# Patient Record
Sex: FEMALE | Race: WHITE | Employment: UNEMPLOYED | URBAN - METROPOLITAN AREA
[De-identification: names, ages, dates, MRNs, and addresses within clinical notes are randomized per-mention and may not be internally consistent; named-entity substitution may affect disease eponyms.]

---

## 2017-09-25 DIAGNOSIS — Z32.01 PREGNANCY TEST POSITIVE: Primary | ICD-10-CM

## 2017-10-05 ENCOUNTER — PRENATAL OFFICE VISIT (OUTPATIENT)
Dept: NURSING | Facility: CLINIC | Age: 22
End: 2017-10-05
Payer: COMMERCIAL

## 2017-10-05 DIAGNOSIS — Z32.01 PREGNANCY TEST POSITIVE: Primary | ICD-10-CM

## 2017-10-05 LAB
ABO + RH BLD: NORMAL
ABO + RH BLD: NORMAL
BETA HCG QUAL IFA URINE: POSITIVE
BLD GP AB SCN SERPL QL: NORMAL
BLOOD BANK CMNT PATIENT-IMP: NORMAL
ERYTHROCYTE [DISTWIDTH] IN BLOOD BY AUTOMATED COUNT: 13 % (ref 10–15)
HCT VFR BLD AUTO: 41.6 % (ref 35–47)
HGB BLD-MCNC: 14 G/DL (ref 11.7–15.7)
MCH RBC QN AUTO: 31.1 PG (ref 26.5–33)
MCHC RBC AUTO-ENTMCNC: 33.7 G/DL (ref 31.5–36.5)
MCV RBC AUTO: 92 FL (ref 78–100)
PLATELET # BLD AUTO: 342 10E9/L (ref 150–450)
RBC # BLD AUTO: 4.5 10E12/L (ref 3.8–5.2)
SPECIMEN EXP DATE BLD: NORMAL
WBC # BLD AUTO: 11.9 10E9/L (ref 4–11)

## 2017-10-05 PROCEDURE — 99207 ZZC NO CHARGE NURSE ONLY: CPT

## 2017-10-05 PROCEDURE — 84703 CHORIONIC GONADOTROPIN ASSAY: CPT | Performed by: OBSTETRICS & GYNECOLOGY

## 2017-10-05 PROCEDURE — 36415 COLL VENOUS BLD VENIPUNCTURE: CPT | Performed by: OBSTETRICS & GYNECOLOGY

## 2017-10-05 PROCEDURE — 86900 BLOOD TYPING SEROLOGIC ABO: CPT | Performed by: OBSTETRICS & GYNECOLOGY

## 2017-10-05 PROCEDURE — 87340 HEPATITIS B SURFACE AG IA: CPT | Performed by: OBSTETRICS & GYNECOLOGY

## 2017-10-05 PROCEDURE — 85027 COMPLETE CBC AUTOMATED: CPT | Performed by: OBSTETRICS & GYNECOLOGY

## 2017-10-05 PROCEDURE — 86762 RUBELLA ANTIBODY: CPT | Performed by: OBSTETRICS & GYNECOLOGY

## 2017-10-05 PROCEDURE — 86780 TREPONEMA PALLIDUM: CPT | Performed by: OBSTETRICS & GYNECOLOGY

## 2017-10-05 PROCEDURE — 87389 HIV-1 AG W/HIV-1&-2 AB AG IA: CPT | Performed by: OBSTETRICS & GYNECOLOGY

## 2017-10-05 PROCEDURE — 86901 BLOOD TYPING SEROLOGIC RH(D): CPT | Performed by: OBSTETRICS & GYNECOLOGY

## 2017-10-05 PROCEDURE — 87086 URINE CULTURE/COLONY COUNT: CPT | Performed by: OBSTETRICS & GYNECOLOGY

## 2017-10-05 PROCEDURE — 86850 RBC ANTIBODY SCREEN: CPT | Performed by: OBSTETRICS & GYNECOLOGY

## 2017-10-05 RX ORDER — PRENATAL VIT/IRON FUM/FOLIC AC 27MG-0.8MG
1 TABLET ORAL DAILY
Qty: 100 TABLET | Refills: 3 | COMMUNITY
Start: 2017-10-05

## 2017-10-05 NOTE — NURSING NOTE
NPN nurse visit. 1st dr visit scheduled for 10/18/17 with Frankie Aragon M.D. Pap due. Last pap unsure.  8w5d.  ARI Herrera RN

## 2017-10-05 NOTE — MR AVS SNAPSHOT
After Visit Summary   10/5/2017    Opal Hillman    MRN: [id number]           Patient Information     Date Of Birth          1995        Visit Information        Provider Department      10/5/2017 2:00 PM [facility] PRENATAL NURSE [place] [facility]        Today's Diagnoses     Pregnancy test positive    -  1       Follow-ups after your visit        Your next 10 appointments already scheduled     Oct 10, 2017  2:00 PM CDT   US OB < 14 WEEKS WITH TRANSVAGINAL SINGLE with OXUS1   [facility] ([facility])    [address] [id number] [phone]           Please bring a list of your medicines (including vitamins, minerals and over-the-counter drugs). Also, tell your doctor about any allergies you may have. Wear comfortable clothes and leave your valuables at home.  If you re less than 20 weeks drink four 8-ounce glasses of fluid an hour before your exam. If you need to empty your bladder before your exam, try to release only a little urine. Then, drink another glass of fluid.  You may have up to two family members in the exam room. If you bring a small child, an adult must be there to care for him or her.  Please call the Imaging Department at your exam site with any questions.            Oct 18, 2017  2:30 PM CDT   New Prenatal with Frankie Aragon MD   [facility] ([facility]    [address] [place] [address] [phone]              Future tests that were ordered for you today     Open Future Orders        Priority Expected Expires Ordered    US OB <14 Weeks w Transvaginal Single Routine  10/5/2018 10/5/2017            Who to contact     If you have questions or need follow up information about today's clinic visit or your schedule please contact [facility] directly at [phone].  Normal or non-critical lab and imaging results will be  "communicated to you by Explore Engagehart, letter or phone within 4 business days after the clinic has received the results. If you do not hear from us within 7 days, please contact the clinic through BooknGo or phone. If you have a critical or abnormal lab result, we will notify you by phone as soon as possible.  Submit refill requests through BooknGo or call your pharmacy and they will forward the refill request to us. Please allow 3 business days for your refill to be completed.          Additional Information About Your Visit        BooknGo Information     BooknGo lets you send messages to your doctor, view your test results, renew your prescriptions, schedule appointments and more. To sign up, go to [email or web address] . Click on \"Log in\" on the left side of the screen, which will take you to the Welcome page. Then click on \"Sign up Now\" on the right side of the page.     You will be asked to enter the access code listed below, as well as some personal information. Please follow the directions to create your username and password.     Your access code is: [email or web address]  Expires: 1/3/2018  4:07 PM     Your access code will  in 90 days. If you need help or a new code, please call your [facility] clinic or [phone].        Care EveryWhere ID     This is your Care EveryWhere ID. This could be used by other organizations to access your [facility] medical records  [id number]        Your Vitals Were     Last Period                   2017            Blood Pressure from Last 3 Encounters:   14 130/82    Weight from Last 3 Encounters:   No data found for Wt              We Performed the Following     ABO/Rh type and screen     Anti Treponema     Beta HCG qual IFA urine - [facility] and [place]     CBC with platelets     Hepatitis B surface antigen     HIV Antigen Antibody Combo     Rubella Antibody IgG Quantitative     Urine Culture Aerobic Bacterial        Primary Care Provider Office Phone # Fax #    [facility] [phone]       [address] [facility] [address]        Equal Access to Services     MARIE SHARMA : Hadii aad ku hadvincentpebbles Escalera, ayleen harpermelanieha, judit shirinvaneda sergio, liseth florence laZeynepcamille meier. So [facility] [phone].    ATENCIÓN: Si habla español, tiene a castellanos disposición servicios gratuitos de asistencia lingüística. Llame al [phone].    We comply with applicable federal civil rights laws and [place] laws. We do not discriminate on the basis of race, color, national origin, age, disability, sex, sexual orientation, or gender identity.            Thank you!     Thank you for choosing [facility]  for your care. Our goal is always to provide you with excellent care. Hearing back from our patients is one way we can continue to improve our services. Please take a few minutes to complete the written survey that you may receive in the mail after your visit with us. Thank you!             Your Updated Medication List - Protect others around you: Learn how to safely use, store and throw away your medicines at www.disposemymeds.org.          This list is accurate as of: 10/5/17  4:07 PM.  Always use your most recent med list.                   Brand Name Dispense Instructions for use Diagnosis    cholecalciferol 1000 UNIT tablet    vitamin D    30 tablet    Take by mouth daily        prenatal multivitamin plus iron 27-0.8 MG Tabs per tablet     100 tablet    Take 1 tablet by mouth daily

## 2017-10-06 LAB
BACTERIA SPEC CULT: NORMAL
HBV SURFACE AG SERPL QL IA: NONREACTIVE
HIV 1+2 AB+HIV1 P24 AG SERPL QL IA: NONREACTIVE
RUBV IGG SERPL IA-ACNC: 162 IU/ML
SPECIMEN SOURCE: NORMAL
T PALLIDUM IGG+IGM SER QL: NEGATIVE

## 2017-10-10 ENCOUNTER — RADIANT APPOINTMENT (OUTPATIENT)
Dept: ULTRASOUND IMAGING | Facility: CLINIC | Age: 22
End: 2017-10-10
Attending: OBSTETRICS & GYNECOLOGY
Payer: COMMERCIAL

## 2017-10-10 DIAGNOSIS — Z32.01 PREGNANCY TEST POSITIVE: ICD-10-CM

## 2017-10-10 PROCEDURE — 76801 OB US < 14 WKS SINGLE FETUS: CPT | Performed by: OBSTETRICS & GYNECOLOGY

## 2017-10-11 NOTE — PROGRESS NOTES
Please notify the patient of a normal ultrasound results and  assign her due date Of May 2, 2018 Based on early first trimester ultrasound  Nawaf Laughlin M.D.

## 2017-10-18 ENCOUNTER — PRENATAL OFFICE VISIT (OUTPATIENT)
Dept: OBGYN | Facility: CLINIC | Age: 22
End: 2017-10-18
Payer: COMMERCIAL

## 2017-10-18 VITALS — WEIGHT: 217 LBS | DIASTOLIC BLOOD PRESSURE: 62 MMHG | SYSTOLIC BLOOD PRESSURE: 102 MMHG | HEART RATE: 76 BPM

## 2017-10-18 DIAGNOSIS — O09.91 HIGH-RISK PREGNANCY IN FIRST TRIMESTER: Primary | ICD-10-CM

## 2017-10-18 PROBLEM — O09.293 PRIOR FETAL MACROSOMIA IN THIRD TRIMESTER, ANTEPARTUM: Status: ACTIVE | Noted: 2017-10-18

## 2017-10-18 PROBLEM — O34.219 PREVIOUS CESAREAN DELIVERY, ANTEPARTUM CONDITION OR COMPLICATION: Status: ACTIVE | Noted: 2017-10-18

## 2017-10-18 PROBLEM — O09.90 HIGH-RISK PREGNANCY: Status: ACTIVE | Noted: 2017-10-18

## 2017-10-18 PROBLEM — O09.299 HISTORY OF PRE-ECLAMPSIA IN PRIOR PREGNANCY, CURRENTLY PREGNANT: Status: ACTIVE | Noted: 2017-10-18

## 2017-10-18 PROCEDURE — 87591 N.GONORRHOEAE DNA AMP PROB: CPT | Performed by: OBSTETRICS & GYNECOLOGY

## 2017-10-18 PROCEDURE — G0145 SCR C/V CYTO,THINLAYER,RESCR: HCPCS | Performed by: OBSTETRICS & GYNECOLOGY

## 2017-10-18 PROCEDURE — 87491 CHLMYD TRACH DNA AMP PROBE: CPT | Performed by: OBSTETRICS & GYNECOLOGY

## 2017-10-18 PROCEDURE — 99207 ZZC FIRST OB VISIT: CPT | Performed by: OBSTETRICS & GYNECOLOGY

## 2017-10-18 NOTE — MR AVS SNAPSHOT
"              After Visit Summary   10/18/2017    Opal Hillman    MRN: [id number]           Patient Information     Date Of Birth          1995        Visit Information        Provider Department      10/18/2017 2:30 PM [facility] MD [facility]        Today's Diagnoses     High-risk pregnancy in first trimester    -  1       Follow-ups after your visit        Follow-up notes from your care team     Return in about 4 weeks (around 11/15/2017).      Your next 10 appointments already scheduled     Nov 15, 2017  7:00 PM CST   ESTABLISHED PRENATAL with Frankie Aragon MD   [facility] ([facility])    [address] [place] [address]  [address]  [facility] [address] [phone]            Dec 13, 2017  7:00 PM CST   ESTABLISHED PRENATAL with Frankie Aragon MD   [facility] ([facility])    [address] [place] [address]  [address]  [facility] [address] [phone]              Who to contact     If you have questions or need follow up information about today's clinic visit or your schedule please contact [facility] directly at [phone].  Normal or non-critical lab and imaging results will be communicated to you by MyChart, letter or phone within 4 business days after the clinic has received the results. If you do not hear from us within 7 days, please contact the clinic through MyChart or phone. If you have a critical or abnormal lab result, we will notify you by phone as soon as possible.  Submit refill requests through Aconex or call your pharmacy and they will forward the refill request to us. Please allow 3 business days for your refill to be completed.          Additional Information About Your Visit        MyChart Information     Aconex lets you send messages to your doctor, view your test results, renew your prescriptions, schedule appointments and more. To sign up, go to [email or web address] . Click on \"Log " "in\" on the left side of the screen, which will take you to the Welcome page. Then click on \"Sign up Now\" on the right side of the page.     You will be asked to enter the access code listed below, as well as some personal information. Please follow the directions to create your username and password.     Your access code is: [email or web address]  Expires: 1/3/2018  4:07 PM     Your access code will  in 90 days. If you need help or a new code, please call your [facility] clinic or [phone].        Care EveryWhere ID     This is your Care EveryWhere ID. This could be used by other organizations to access your [facility] medical records  [id number]        Your Vitals Were     Pulse Last Period                76 2017           Blood Pressure from Last 3 Encounters:   10/18/17 102/62   14 130/82    Weight from Last 3 Encounters:   10/18/17 217 lb (98.4 kg)              We Performed the Following     CHLAMYDIA TRACHOMATIS PCR     NEISSERIA GONORRHOEA PCR     Pap imaged thin layer screen only - recommended age 21 - 24 years        Primary Care Provider Office Phone # Fax #    [facility] [phone]       [address] [place] [address]        Equal Access to Services     MARIE SHARMA : Hadii aad ku hadasho Soomaali, waaxda luqadaha, qaybta kaalmada adeegyada, waxay mauroin miguel moss . So [facility] [phone].    ATENCIÓN: Si habla español, tiene a castellanos disposición servicios gratuitos de asistencia lingüística. Llame al [phone].    We comply with applicable federal civil rights laws and [place] laws. We do not discriminate on the basis of race, color, national origin, age, disability, sex, sexual orientation, or gender identity.            Thank you!     Thank you for choosing [facility]  for your care. Our goal is always to provide you with excellent care. Hearing back from our patients is one way we can continue to improve our services. Please " take a few minutes to complete the written survey that you may receive in the mail after your visit with us. Thank you!             Your Updated Medication List - Protect others around you: Learn how to safely use, store and throw away your medicines at www.disposemymeds.org.          This list is accurate as of: 10/18/17  3:36 PM.  Always use your most recent med list.                   Brand Name Dispense Instructions for use Diagnosis    cholecalciferol 1000 UNIT tablet    vitamin D    30 tablet    Take by mouth daily        prenatal multivitamin plus iron 27-0.8 MG Tabs per tablet     100 tablet    Take 1 tablet by mouth daily

## 2017-10-18 NOTE — NURSING NOTE
Chief Complaint   Patient presents with     Prenatal Care     new prenatal visit - US done 10/10/17 - pap and gc - abdominal pain along  incision     12w0d    Initial /62 (BP Location: Right arm, Patient Position: Chair, Cuff Size: Adult Large)  Pulse 76  Wt 217 lb (98.4 kg)  LMP 2017 There is no height or weight on file to calculate BMI.  Medication Reconciliation: complete     Nurse assisted visit.  Elisabeth Vick MA.

## 2017-10-18 NOTE — PROGRESS NOTES
SUBJECTIVE:  Opal Hillman is an 22 year old  P1 woman who presents for NOB exam      Past Medical History:   Diagnosis Date     NO ACTIVE PROBLEMS      Past Surgical History:   Procedure Laterality Date      SECTION       Current Outpatient Prescriptions   Medication     Prenatal Vit-Fe Fumarate-FA (PRENATAL MULTIVITAMIN PLUS IRON) 27-0.8 MG TABS per tablet     cholecalciferol (VITAMIN D3) 1000 UNIT tablet     No current facility-administered medications for this visit.      No Known Allergies  Social History   Substance Use Topics     Smoking status: Never Smoker     Smokeless tobacco: Never Used     Alcohol use No       Review of Systems  CONSTITUTIONAL:NEGATIVE  EYES: NEGATIVE  ENT/MOUTH: NEGATIVE  RESP: NEGATIVE  CV: NEGATIVE  GI: NEGATIVE  : NEGATIVE  MUSCULOSKELATAL: NEGATIVE  INTEGUMENTARY/SKIN: NEGATIVE  BREAST: NEGATIVE  NEURO: NEGATIVE  ENDOCRINE: NEGATIVE  HEME/ALLERGY/IMMUNE: NEGATIVE  PSYCHIATRIC: NEGATIVE    OBJECTIVE:  /62 (BP Location: Right arm, Patient Position: Chair, Cuff Size: Adult Large)  Pulse 76  Wt 217 lb (98.4 kg)  LMP 2017   EXAM:  GENERAL APPEARANCE: healthy, alert and no distress  BREAST: normal without masses, tenderness or nipple discharge and no palpable axillary masses or adenopathy  ABDOMEN: soft, nontender, without hepatosplenomegaly or masses    Pelvic Exam:  Urethra; negative  Vulva: No external lesions, normal hair distribution, no adenopathy  Vagina: Moist, pink, no abnormal discharge, well rugated, no lesions  Cervix: nulliparous, smooth, pink, no visible lesions  Uterus: Normal size, anteverted, non-tender, mobile  Ovaries: No mass, non-tender, mobile      ASSESSMENT:  IUP at 12 weeks      First Trimester Screen, CF testing, and Progenity offered  History of fetal macrosomia  History of previous C/S    PLAN:  (O09.91) High-risk pregnancy in first trimester  (primary encounter diagnosis)          Health Maintenance   Topic Date Due      CHLAMYDIA SCREENING  1995     HPV IMMUNIZATION (1 of 3 - Female 3 Dose Series) 05/24/2005     PAP Q3 YR  05/24/2007     TETANUS IMMUNIZATION (SYSTEM ASSIGNED)  05/24/2012     INFLUENZA VACCINE (SYSTEM ASSIGNED)  09/01/2017     MATERNAL SCREENING  11/08/2017

## 2017-10-18 NOTE — LETTER
October 26, 2017      Opal Garcianey  [address] [facility] [address]    Dear ,      I am happy to inform you that your recent cervical cancer screening test (PAP smear) was normal.      Preventative screenings such as this help to ensure your health for years to come. You should repeat a pap smear in 3 years, unless otherwise directed.      You will still need to return to the clinic every year for your annual exam and other preventive tests.     Please contact the clinic at [phone] if you have further questions.       Sincerely,      Frankie Aragon MD/[facility]

## 2017-10-20 LAB
C TRACH DNA SPEC QL NAA+PROBE: NEGATIVE
N GONORRHOEA DNA SPEC QL NAA+PROBE: NEGATIVE
SPECIMEN SOURCE: NORMAL
SPECIMEN SOURCE: NORMAL

## 2017-10-23 LAB
COPATH REPORT: NORMAL
PAP: NORMAL

## 2017-11-15 ENCOUNTER — PRENATAL OFFICE VISIT (OUTPATIENT)
Dept: OBGYN | Facility: CLINIC | Age: 22
End: 2017-11-15
Payer: COMMERCIAL

## 2017-11-15 VITALS — DIASTOLIC BLOOD PRESSURE: 62 MMHG | WEIGHT: 223 LBS | HEART RATE: 72 BPM | SYSTOLIC BLOOD PRESSURE: 100 MMHG

## 2017-11-15 DIAGNOSIS — O09.92 HIGH-RISK PREGNANCY IN SECOND TRIMESTER: Primary | ICD-10-CM

## 2017-11-15 PROCEDURE — 99207 ZZC COMPLICATED OB VISIT: CPT | Performed by: OBSTETRICS & GYNECOLOGY

## 2017-11-15 NOTE — NURSING NOTE
Chief Complaint   Patient presents with     Prenatal Care     pt declines on mat quad screen - US info given     16w0d    Initial /62 (BP Location: Right arm, Patient Position: Chair, Cuff Size: Adult Large)  Pulse 72  Wt 223 lb (101.2 kg)  LMP 08/05/2017 There is no height or weight on file to calculate BMI.  Medication Reconciliation: complete     Nurse assisted visit.  Elisabeth Vick MA.

## 2017-11-15 NOTE — MR AVS SNAPSHOT
"              After Visit Summary   11/15/2017    Opal Hillman    MRN: [id number]           Patient Information     Date Of Birth          1995        Visit Information        Provider Department      11/15/2017 5:00 PM Frankie Aragon MD [facility]        Today's Diagnoses     High-risk pregnancy in second trimester    -  1       Follow-ups after your visit        Follow-up notes from your care team     Return in about 4 weeks (around 12/13/2017).      Your next 10 appointments already scheduled     Dec 14, 2017  4:00 PM CST   ESTABLISHED PRENATAL with LEOPOLDO Washington CNM   [facility]    [address]  [address]  [facility] [address] [phone]              Future tests that were ordered for you today     Open Future Orders        Priority Expected Expires Ordered    US OB > 14 Weeks Complete Single Routine  11/15/2018 11/15/2017            Who to contact     If you have questions or need follow up information about today's clinic visit or your schedule please contact [facility] directly at [phone].  Normal or non-critical lab and imaging results will be communicated to you by RocketBankhart, letter or phone within 4 business days after the clinic has received the results. If you do not hear from us within 7 days, please contact the clinic through RocketBankhart or phone. If you have a critical or abnormal lab result, we will notify you by phone as soon as possible.  Submit refill requests through [employer] or call your pharmacy and they will forward the refill request to us. Please allow 3 business days for your refill to be completed.          Additional Information About Your Visit        MyChart Information     [employer] lets you send messages to your doctor, view your test results, renew your prescriptions, schedule appointments and more. To sign up, go to [email or web address] . Click on \"Log in\" on the left side of " "the screen, which will take you to the Welcome page. Then click on \"Sign up Now\" on the right side of the page.     You will be asked to enter the access code listed below, as well as some personal information. Please follow the directions to create your username and password.     Your access code is: [email or web address]  Expires: 1/3/2018  3:07 PM     Your access code will  in 90 days. If you need help or a new code, please call your [place] clinic or [phone].        Care EveryWhere ID     This is your Care EveryWhere ID. This could be used by other organizations to access your [place] medical records  [id number]        Your Vitals Were     Pulse Last Period                72 2017           Blood Pressure from Last 3 Encounters:   11/15/17 100/62   10/18/17 102/62   14 130/82    Weight from Last 3 Encounters:   11/15/17 223 lb (101.2 kg)   10/18/17 217 lb (98.4 kg)               Primary Care Provider Office Phone # Fax #    [facility] [phone]       [address] [facility] [address]        Equal Access to Services     MARIE SHARMA AH: Hadii yanely newton hadasho Soomaali, waaxda luqadaha, qaybta kaalmada adeegyada, liseth meier. So [facility] [phone].    ATENCIÓN: Si habla español, tiene a castellanos disposición servicios gratuitos de asistencia lingüística. Llame al [phone].    We comply with applicable federal civil rights laws and [place] laws. We do not discriminate on the basis of race, color, national origin, age, disability, sex, sexual orientation, or gender identity.            Thank you!     Thank you for choosing [facility]  for your care. Our goal is always to provide you with excellent care. Hearing back from our patients is one way we can continue to improve our services. Please take a few minutes to complete the written survey that you may receive in the mail after your visit with us. Thank you!             Your " Updated Medication List - Protect others around you: Learn how to safely use, store and throw away your medicines at www.disposemymeds.org.          This list is accurate as of: 11/15/17  5:43 PM.  Always use your most recent med list.                   Brand Name Dispense Instructions for use Diagnosis    cholecalciferol 1000 UNIT tablet    vitamin D3    30 tablet    Take by mouth daily        prenatal multivitamin plus iron 27-0.8 MG Tabs per tablet     100 tablet    Take 1 tablet by mouth daily

## 2017-12-11 ENCOUNTER — TELEPHONE (OUTPATIENT)
Dept: OBGYN | Facility: CLINIC | Age: 22
End: 2017-12-11

## 2017-12-11 NOTE — TELEPHONE ENCOUNTER
[facility] is requesting a doctors note for this pt stating she is in good health in order for them to see her for a dental check up this pregnancy. Their fax number is [phone]. Will complete letter and send it to the dental office.      Dang Stephens RN

## 2017-12-11 NOTE — LETTER
[place] [address] [place]  [facility] [address]  [phone]        December 11, 2017    To whom it may concern,    Opal Hillman is pregnant and is in good health. Please accept her into your care for a dental cleaning. Feel free to contact our clinic with any questions at [phone].    Sincerely,        Frankie Aragon MD

## 2017-12-13 ENCOUNTER — RADIANT APPOINTMENT (OUTPATIENT)
Dept: ULTRASOUND IMAGING | Facility: CLINIC | Age: 22
End: 2017-12-13
Attending: OBSTETRICS & GYNECOLOGY
Payer: COMMERCIAL

## 2017-12-13 DIAGNOSIS — O09.92 HIGH-RISK PREGNANCY IN SECOND TRIMESTER: ICD-10-CM

## 2017-12-21 ENCOUNTER — PRENATAL OFFICE VISIT (OUTPATIENT)
Dept: OBGYN | Facility: CLINIC | Age: 22
End: 2017-12-21
Payer: COMMERCIAL

## 2017-12-21 VITALS — HEART RATE: 80 BPM | WEIGHT: 230 LBS | SYSTOLIC BLOOD PRESSURE: 102 MMHG | DIASTOLIC BLOOD PRESSURE: 62 MMHG

## 2017-12-21 DIAGNOSIS — O09.92 HIGH-RISK PREGNANCY IN SECOND TRIMESTER: Primary | ICD-10-CM

## 2017-12-21 PROCEDURE — 99207 ZZC COMPLICATED OB VISIT: CPT | Performed by: ADVANCED PRACTICE MIDWIFE

## 2017-12-21 NOTE — NURSING NOTE
Chief Complaint   Patient presents with     Prenatal Care     21 weeks 1 day- no concerns        Initial /62 (BP Location: Right arm, Patient Position: Sitting, Cuff Size: Adult Regular)  Pulse 80  Wt 230 lb (104.3 kg)  LMP 2017  Breastfeeding? No There is no height or weight on file to calculate BMI.  BP completed using cuff size: regular        The following HM Due: NONE    patient has appointment for today.  21w1d  Vahe Leyva CMA

## 2017-12-21 NOTE — MR AVS SNAPSHOT
"              After Visit Summary   12/21/2017    Opal Hillman    MRN: [id number]           Patient Information     Date Of Birth          1995        Visit Information        Provider Department      12/21/2017 3:00 PM Isa Bernstein, LEOPOLDO VELA [facility]        Today's Diagnoses     High-risk pregnancy in second trimester    -  1       Follow-ups after your visit        Follow-up notes from your care team     Return in about 4 weeks (around 1/18/2018).      Your next 10 appointments already scheduled     Jan 02, 2018  2:00 PM CST   (Arrive by 1:45 PM)   [facility]    [address] [address] [phone]              Who to contact     If you have questions or need follow up information about today's clinic visit or your schedule please contact [facility] directly at [phone].  Normal or non-critical lab and imaging results will be communicated to you by Minimally invasive deviceshart, letter or phone within 4 business days after the clinic has received the results. If you do not hear from us within 7 days, please contact the clinic through Minimally invasive deviceshart or phone. If you have a critical or abnormal lab result, we will notify you by phone as soon as possible.  Submit refill requests through First30Days or call your pharmacy and they will forward the refill request to us. Please allow 3 business days for your refill to be completed.          Additional Information About Your Visit        Minimally invasive deviceshart Information     First30Days lets you send messages to your doctor, view your test results, renew your prescriptions, schedule appointments and more. To sign up, go to [email or web address] . Click on \"Log in\" on the left side of the screen, which will take you to the Welcome page. Then click on \"Sign up Now\" on the right side of the page.     You will be asked to enter the access code listed below, as well as " some personal information. Please follow the directions to create your username and password.     Your access code is: [email or web address]  Expires: 1/3/2018  3:07 PM     Your access code will  in 90 days. If you need help or a new code, please call your [place] clinic or [phone].        Care EveryWhere ID     This is your Care EveryWhere ID. This could be used by other organizations to access your [place] medical records  [id number]        Your Vitals Were     Pulse Last Period Breastfeeding?             80 2017 No          Blood Pressure from Last 3 Encounters:   17 102/62   11/15/17 100/62   10/18/17 102/62    Weight from Last 3 Encounters:   17 230 lb (104.3 kg)   11/15/17 223 lb (101.2 kg)   10/18/17 217 lb (98.4 kg)              Today, you had the following     No orders found for display       Primary Care Provider Office Phone # Fax #    Jeremi [facility] [phone]       [address] [place] [address]        Equal Access to Services     MARIE SHARMA : Hadii yanely ku hadasho Soomaali, waaxda luqadaha, qaybta kaalmada adeegyada, liseth moss . So [facility] [phone].    ATENCIÓN: Si habla español, tiene a castellanos disposición servicios gratuitos de asistencia lingüística. Llame al [phone].    We comply with applicable federal civil rights laws and [place] laws. We do not discriminate on the basis of race, color, national origin, age, disability, sex, sexual orientation, or gender identity.            Thank you!     Thank you for choosing [facility]  for your care. Our goal is always to provide you with excellent care. Hearing back from our patients is one way we can continue to improve our services. Please take a few minutes to complete the written survey that you may receive in the mail after your visit with us. Thank you!             Your Updated Medication List - Protect others around you: Learn how to safely use,  store and throw away your medicines at www.disposemymeds.org.          This list is accurate as of: 12/21/17  3:20 PM.  Always use your most recent med list.                   Brand Name Dispense Instructions for use Diagnosis    cholecalciferol 1000 UNIT tablet    vitamin D3    30 tablet    Take by mouth daily        prenatal multivitamin plus iron 27-0.8 MG Tabs per tablet     100 tablet    Take 1 tablet by mouth daily

## 2017-12-21 NOTE — PROGRESS NOTES
Opal Hillman is here with her daughter for a routine prenatal visit at 21w1d.  She has no concerns.  She is feeling fetal movement regurally. Fetal survey ultrasound results reviewed today; has repeat on 1-2-18 for better visualization of outflow tracts. Patient states she is unsure if she will repeat ultrasound.  Discussed recommendation for better visualization.  Appetite is normal. Interval weight gain is appropriate.  Anticipatory guidance, warning signs, when to call information reviewed.

## 2018-01-29 ENCOUNTER — PRENATAL OFFICE VISIT (OUTPATIENT)
Dept: OBGYN | Facility: CLINIC | Age: 23
End: 2018-01-29
Payer: COMMERCIAL

## 2018-01-29 VITALS — SYSTOLIC BLOOD PRESSURE: 106 MMHG | DIASTOLIC BLOOD PRESSURE: 64 MMHG | WEIGHT: 237 LBS | HEART RATE: 84 BPM

## 2018-01-29 DIAGNOSIS — O09.93 HIGH-RISK PREGNANCY IN THIRD TRIMESTER: Primary | ICD-10-CM

## 2018-01-29 PROCEDURE — 86780 TREPONEMA PALLIDUM: CPT | Performed by: OBSTETRICS & GYNECOLOGY

## 2018-01-29 PROCEDURE — 99207 ZZC COMPLICATED OB VISIT: CPT | Performed by: OBSTETRICS & GYNECOLOGY

## 2018-01-29 PROCEDURE — 36415 COLL VENOUS BLD VENIPUNCTURE: CPT | Performed by: OBSTETRICS & GYNECOLOGY

## 2018-01-29 PROCEDURE — 82950 GLUCOSE TEST: CPT | Performed by: OBSTETRICS & GYNECOLOGY

## 2018-01-29 PROCEDURE — 85027 COMPLETE CBC AUTOMATED: CPT | Performed by: OBSTETRICS & GYNECOLOGY

## 2018-01-29 NOTE — MR AVS SNAPSHOT
"              After Visit Summary   2018    Opal Hillman    MRN: [id number]           Patient Information     Date Of Birth          1995        Visit Information        Provider Department      2018 6:45 PM Frankie Aragon MD [facility]        Today's Diagnoses     High-risk pregnancy in third trimester    -  1       Follow-ups after your visit        Follow-up notes from your care team     Return in about 4 weeks (around 2018).      Who to contact     If you have questions or need follow up information about today's clinic visit or your schedule please contact [facility] directly at [phone].  Normal or non-critical lab and imaging results will be communicated to you by MyChart, letter or phone within 4 business days after the clinic has received the results. If you do not hear from us within 7 days, please contact the clinic through MyChart or phone. If you have a critical or abnormal lab result, we will notify you by phone as soon as possible.  Submit refill requests through [employer] or call your pharmacy and they will forward the refill request to us. Please allow 3 business days for your refill to be completed.          Additional Information About Your Visit        MyChart Information     [employer] lets you send messages to your doctor, view your test results, renew your prescriptions, schedule appointments and more. To sign up, go to [email or web address] . Click on \"Log in\" on the left side of the screen, which will take you to the Welcome page. Then click on \"Sign up Now\" on the right side of the page.     You will be asked to enter the access code listed below, as well as some personal information. Please follow the directions to create your username and password.     Your access code is: [email or web address]  Expires: 2018  3:32 PM     Your access code will  in 90 days. If you need help or a new code, please call your [facility] or " [phone].        Care EveryWhere ID     This is your Care EveryWhere ID. This could be used by other organizations to access your [place] medical records  [id number]        Your Vitals Were     Pulse Last Period                84 08/05/2017           Blood Pressure from Last 3 Encounters:   01/29/18 106/64   12/21/17 102/62   11/15/17 100/62    Weight from Last 3 Encounters:   01/29/18 237 lb (107.5 kg)   12/21/17 230 lb (104.3 kg)   11/15/17 223 lb (101.2 kg)              We Performed the Following     Anti Treponema     CBC with platelets     Glucose tolerance, gest screen, 1 hour        Primary Care Provider Office Phone # Fax #    Jeremi [facility] [phone]       [address] [facility] [address]        Equal Access to Services     MARIE SHARMA : Hadii yanely newton hadasho Soomaali, waaxda luqadaha, qaybta kaalmada adejosé manuelyasarah, liseth moss . So [facility] [phone].    ATENCIÓN: Si habla español, tiene a castellanos disposición servicios gratuitos de asistencia lingüística. Eduardo al [phone].    We comply with applicable federal civil rights laws and [place] laws. We do not discriminate on the basis of race, color, national origin, age, disability, sex, sexual orientation, or gender identity.            Thank you!     Thank you for choosing [facility]  for your care. Our goal is always to provide you with excellent care. Hearing back from our patients is one way we can continue to improve our services. Please take a few minutes to complete the written survey that you may receive in the mail after your visit with us. Thank you!             Your Updated Medication List - Protect others around you: Learn how to safely use, store and throw away your medicines at www.disposemymeds.org.          This list is accurate as of 1/29/18 11:59 PM.  Always use your most recent med list.                   Brand Name Dispense Instructions for use Diagnosis     cholecalciferol 1000 UNIT tablet    vitamin D3    30 tablet    Take by mouth daily        prenatal multivitamin plus iron 27-0.8 MG Tabs per tablet     100 tablet    Take 1 tablet by mouth daily

## 2018-01-30 LAB
ERYTHROCYTE [DISTWIDTH] IN BLOOD BY AUTOMATED COUNT: 13.1 % (ref 10–15)
GLUCOSE 1H P 50 G GLC PO SERPL-MCNC: 128 MG/DL (ref 60–129)
HCT VFR BLD AUTO: 37.9 % (ref 35–47)
HGB BLD-MCNC: 12.6 G/DL (ref 11.7–15.7)
MCH RBC QN AUTO: 31.5 PG (ref 26.5–33)
MCHC RBC AUTO-ENTMCNC: 33.2 G/DL (ref 31.5–36.5)
MCV RBC AUTO: 95 FL (ref 78–100)
PLATELET # BLD AUTO: 275 10E9/L (ref 150–450)
RBC # BLD AUTO: 4 10E12/L (ref 3.8–5.2)
WBC # BLD AUTO: 10.9 10E9/L (ref 4–11)

## 2018-01-30 NOTE — NURSING NOTE
Chief Complaint   Patient presents with     Prenatal Care     1 hour glucose     26w5d    Initial /64 (BP Location: Right arm, Patient Position: Chair, Cuff Size: Adult Large)  Pulse 84  Wt 237 lb (107.5 kg)  LMP 08/05/2017 There is no height or weight on file to calculate BMI.  Medication Reconciliation: complete     Nurse assisted visit.  Elisabeth Vick MA.

## 2018-01-30 NOTE — PROGRESS NOTES
IUP at 26 5/7 weeks;      Recommend f/u ultrasound for completion of fetal survey     Glucose today

## 2018-01-31 LAB — T PALLIDUM IGG+IGM SER QL: NEGATIVE

## 2018-04-03 ENCOUNTER — PRENATAL OFFICE VISIT (OUTPATIENT)
Dept: OBGYN | Facility: CLINIC | Age: 23
End: 2018-04-03
Payer: COMMERCIAL

## 2018-04-03 VITALS — WEIGHT: 250 LBS | SYSTOLIC BLOOD PRESSURE: 112 MMHG | HEART RATE: 92 BPM | DIASTOLIC BLOOD PRESSURE: 72 MMHG

## 2018-04-03 DIAGNOSIS — O09.93 HIGH-RISK PREGNANCY IN THIRD TRIMESTER: Primary | ICD-10-CM

## 2018-04-03 DIAGNOSIS — O09.293 PRIOR FETAL MACROSOMIA IN THIRD TRIMESTER, ANTEPARTUM: ICD-10-CM

## 2018-04-03 PROCEDURE — 99207 ZZC COMPLICATED OB VISIT: CPT | Performed by: OBSTETRICS & GYNECOLOGY

## 2018-04-03 PROCEDURE — 87653 STREP B DNA AMP PROBE: CPT | Performed by: OBSTETRICS & GYNECOLOGY

## 2018-04-03 NOTE — PROGRESS NOTES
IUP at 35 6/7 weeks; hiatus of prenatal care     -Group B today     -recommend that she make decision re:  or scheduled C/S         -signs of recurring large fetus      -recommend ultrasound for EFW     -importance of keeping prenatal visits reviewed  PIH symptoms reviewed; none at present

## 2018-04-03 NOTE — MR AVS SNAPSHOT
"              After Visit Summary   4/3/2018    Opal Hillman    MRN: [id number]           Patient Information     Date Of Birth          1995        Visit Information        Provider Department      4/3/2018 4:30 PM Frankie Aragon MD [facility]        Today's Diagnoses     High-risk pregnancy in third trimester    -  1    Prior fetal macrosomia in third trimester, antepartum           Follow-ups after your visit        Follow-up notes from your care team     Return in about 1 week (around 4/10/2018).      Future tests that were ordered for you today     Open Future Orders        Priority Expected Expires Ordered    US OB Ltd One Or More Fetus FU/Repeat Routine  4/3/2019 4/3/2018            Who to contact     If you have questions or need follow up information about today's clinic visit or your schedule please contact [facility] directly at [phone].  Normal or non-critical lab and imaging results will be communicated to you by MyChart, letter or phone within 4 business days after the clinic has received the results. If you do not hear from us within 7 days, please contact the clinic through MySQUARhart or phone. If you have a critical or abnormal lab result, we will notify you by phone as soon as possible.  Submit refill requests through [employer] or call your pharmacy and they will forward the refill request to us. Please allow 3 business days for your refill to be completed.          Additional Information About Your Visit        MyChart Information     [employer] lets you send messages to your doctor, view your test results, renew your prescriptions, schedule appointments and more. To sign up, go to [email or web address] . Click on \"Log in\" on the left side of the screen, which will take you to the Welcome page. Then click on \"Sign up Now\" on the right side of the page.     You will be asked to enter the access code listed below, as well as some personal information. Please " follow the directions to create your username and password.     Your access code is: [email or web address]  Expires: 2018  4:32 PM     Your access code will  in 90 days. If you need help or a new code, please call your [place] clinic or [phone].        Care EveryWhere ID     This is your Care EveryWhere ID. This could be used by other organizations to access your [place] medical records  [id number]        Your Vitals Were     Pulse Last Period                92 2017           Blood Pressure from Last 3 Encounters:   18 112/72   18 106/64   17 102/62    Weight from Last 3 Encounters:   18 250 lb (113.4 kg)   18 237 lb (107.5 kg)   17 230 lb (104.3 kg)              We Performed the Following     Strep, Group B by [facility]        Primary Care Provider Office Phone # Fax #    Jeremi [facility] [phone] [address] [facility] [address]        Equal Access to Services     [facility] : Hadii aad ku hadasho Soomaali, waaxda luqadaha, qaybta kaalmada adejosé manuelyada, liseth moss . So [facility] [phone].    ATENCIÓN: Si habla español, tiene a castellanos disposición servicios gratuitos de asistencia lingüística. Eduardo al [phone].    We comply with applicable federal civil rights laws and [place] laws. We do not discriminate on the basis of race, color, national origin, age, disability, sex, sexual orientation, or gender identity.            Thank you!     Thank you for choosing [facility]  for your care. Our goal is always to provide you with excellent care. Hearing back from our patients is one way we can continue to improve our services. Please take a few minutes to complete the written survey that you may receive in the mail after your visit with us. Thank you!             Your Updated Medication List - Protect others around you: Learn how to safely use, store and throw away your medicines at  www.disposemymeds.org.          This list is accurate as of 4/3/18  5:03 PM.  Always use your most recent med list.                   Brand Name Dispense Instructions for use Diagnosis    cholecalciferol 1000 UNIT tablet    vitamin D3    30 tablet    Take by mouth daily        prenatal multivitamin plus iron 27-0.8 MG Tabs per tablet     100 tablet    Take 1 tablet by mouth daily

## 2018-04-05 LAB
GP B STREP DNA SPEC QL NAA+PROBE: NEGATIVE
SPECIMEN SOURCE: NORMAL

## 2018-04-11 ENCOUNTER — PRENATAL OFFICE VISIT (OUTPATIENT)
Dept: OBGYN | Facility: CLINIC | Age: 23
End: 2018-04-11
Payer: COMMERCIAL

## 2018-04-11 VITALS — HEART RATE: 84 BPM | SYSTOLIC BLOOD PRESSURE: 104 MMHG | DIASTOLIC BLOOD PRESSURE: 64 MMHG | WEIGHT: 255.6 LBS

## 2018-04-11 DIAGNOSIS — O09.293 PRIOR FETAL MACROSOMIA IN THIRD TRIMESTER, ANTEPARTUM: ICD-10-CM

## 2018-04-11 DIAGNOSIS — O34.219 PREVIOUS CESAREAN DELIVERY, ANTEPARTUM CONDITION OR COMPLICATION: ICD-10-CM

## 2018-04-11 DIAGNOSIS — O09.93 HIGH-RISK PREGNANCY IN THIRD TRIMESTER: Primary | ICD-10-CM

## 2018-04-11 PROCEDURE — 99207 ZZC COMPLICATED OB VISIT: CPT | Performed by: OBSTETRICS & GYNECOLOGY

## 2018-04-11 NOTE — NURSING NOTE
Chief Complaint   Patient presents with     Prenatal Care   37w0d  No specific concerns today    initial /64  Pulse 84  Wt 255 lb 9.6 oz (115.9 kg)  LMP 08/05/2017 There is no height or weight on file to calculate BMI.  BP completed using cuff size large.  Tri Escobar CMA

## 2018-04-11 NOTE — MR AVS SNAPSHOT
After Visit Summary   2018    Opal Hillman    MRN: [id number]           Patient Information     Date Of Birth          1995        Visit Information        Provider Department      2018 6:30 PM Frankie Aragon MD [facility]        Today's Diagnoses     High-risk pregnancy in third trimester    -  1    Previous  delivery, antepartum condition or complication        Prior fetal macrosomia in third trimester, antepartum           Follow-ups after your visit        Follow-up notes from your care team     Return in about 1 week (around 2018).      Your next 10 appointments already scheduled     2018  6:45 PM CDT   ESTABLISHED PRENATAL with Frankie Aragon MD   [facility])    [address] [place] [address]  [address]  [facility] [address]   [phone]            2018  5:45 PM CDT   ESTABLISHED PRENATAL with Frankie rAagon MD   [facility])    [address] [place] [address]  [address]  [facility] [address]   [phone]            2018  2:00 PM CDT   (Arrive by 1:45 PM)   US OB SINGLE FOLLOW UP REPEAT with OXUS1   [facility]    [address] [address] [phone]           Please bring a list of your medicines (including vitamins, minerals and over-the-counter drugs). Also, tell your doctor about any allergies you may have. Wear comfortable clothes and leave your valuables at home.  If you re less than 20 weeks drink four 8-ounce glasses of fluid an hour before your exam. If you need to empty your bladder before your exam, try to release only a little urine. Then, drink another glass of fluid.  You may have up to two family members in the exam room. If you bring a small child, an adult must be there to care for him or her.  Please call the Imaging Department at your  "exam site with any questions.            2018  7:00 PM CDT   ESTABLISHED PRENATAL with Frankie Aragon MD   [facility] ([facility]    [address]  [facility] [address] [phone]              Who to contact     If you have questions or need follow up information about today's clinic visit or your schedule please contact [facility] directly at [phone].  Normal or non-critical lab and imaging results will be communicated to you by MyChart, letter or phone within 4 business days after the clinic has received the results. If you do not hear from us within 7 days, please contact the clinic through [employer] or phone. If you have a critical or abnormal lab result, we will notify you by phone as soon as possible.  Submit refill requests through [employer] or call your pharmacy and they will forward the refill request to us. Please allow 3 business days for your refill to be completed.          Additional Information About Your Visit        [employer] Information     [employer] lets you send messages to your doctor, view your test results, renew your prescriptions, schedule appointments and more. To sign up, go to [email or web address] . Click on \"Log in\" on the left side of the screen, which will take you to the Welcome page. Then click on \"Sign up Now\" on the right side of the page.     You will be asked to enter the access code listed below, as well as some personal information. Please follow the directions to create your username and password.     Your access code is: [email or web address]  Expires: 2018  4:32 PM     Your access code will  in 90 days. If you need help or a new code, please call your [facility] or [phone].        Care EveryWhere ID     This is your Care EveryWhere ID. This could be used by other organizations to access your [place] medical records  [id number]        Your Vitals Were     Pulse Last Period             "    84 08/05/2017           Blood Pressure from Last 3 Encounters:   04/11/18 104/64   04/03/18 112/72   01/29/18 106/64    Weight from Last 3 Encounters:   04/11/18 255 lb 9.6 oz (115.9 kg)   04/03/18 250 lb (113.4 kg)   01/29/18 237 lb (107.5 kg)              Today, you had the following     No orders found for display       Primary Care Provider Office Phone # Fax #    Jeremi [facility] [phone] [address]  [facility] [address]        Equal Access to Services     [facility] : Hadii yanely newton hadvincento Somelissa, wamillyda luqadaha, qaybta kaalmada adeholli, liseth moss . So [facility] [phone].    ATENCIÓN: Si habla español, tiene a castellanos disposición servicios gratuitos de asistencia lingüística. Llame al [phone].    We comply with applicable federal civil rights laws and [place] laws. We do not discriminate on the basis of race, color, national origin, age, disability, sex, sexual orientation, or gender identity.            Thank you!     Thank you for choosing [facility]  for your care. Our goal is always to provide you with excellent care. Hearing back from our patients is one way we can continue to improve our services. Please take a few minutes to complete the written survey that you may receive in the mail after your visit with us. Thank you!             Your Updated Medication List - Protect others around you: Learn how to safely use, store and throw away your medicines at www.disposemymeds.org.          This list is accurate as of 4/11/18 11:59 PM.  Always use your most recent med list.                   Brand Name Dispense Instructions for use Diagnosis    cholecalciferol 1000 UNIT tablet    vitamin D3    30 tablet    Take by mouth daily        prenatal multivitamin plus iron 27-0.8 MG Tabs per tablet     100 tablet    Take 1 tablet by mouth daily

## 2018-04-18 ENCOUNTER — PRENATAL OFFICE VISIT (OUTPATIENT)
Dept: OBGYN | Facility: CLINIC | Age: 23
End: 2018-04-18
Payer: COMMERCIAL

## 2018-04-18 VITALS — SYSTOLIC BLOOD PRESSURE: 114 MMHG | WEIGHT: 255 LBS | DIASTOLIC BLOOD PRESSURE: 76 MMHG | HEART RATE: 84 BPM

## 2018-04-18 DIAGNOSIS — O09.93 HIGH-RISK PREGNANCY IN THIRD TRIMESTER: Primary | ICD-10-CM

## 2018-04-18 PROCEDURE — 99207 ZZC COMPLICATED OB VISIT: CPT | Performed by: OBSTETRICS & GYNECOLOGY

## 2018-04-18 NOTE — MR AVS SNAPSHOT
After Visit Summary   4/18/2018    Opal Hillman    MRN: [id number]           Patient Information     Date Of Birth          1995        Visit Information        Provider Department      4/18/2018 6:45 PM Frankie Aragon MD [facility]        Today's Diagnoses     High-risk pregnancy in third trimester    -  1       Follow-ups after your visit        Follow-up notes from your care team     Return in about 1 week (around 4/25/2018).      Your next 10 appointments already scheduled     Apr 25, 2018  5:45 PM CDT   ESTABLISHED PRENATAL with Frankie Aragon MD   [facility] ([facility])    [address]  [address]  [facility] [address] [phone]            Apr 27, 2018  2:00 PM CDT   (Arrive by 1:45 PM)   US OB SINGLE FOLLOW UP REPEAT with OXUS1   [facility] ([facility])    [address] [address] [phone]           Please bring a list of your medicines (including vitamins, minerals and over-the-counter drugs). Also, tell your doctor about any allergies you may have. Wear comfortable clothes and leave your valuables at home.  If you re less than 20 weeks drink four 8-ounce glasses of fluid an hour before your exam. If you need to empty your bladder before your exam, try to release only a little urine. Then, drink another glass of fluid.  You may have up to two family members in the exam room. If you bring a small child, an adult must be there to care for him or her.  Please call the Imaging Department at your exam site with any questions.            Apr 30, 2018  7:00 PM CDT   ESTABLISHED PRENATAL with Frankie Aragon MD   [facility]    [address]  [facility] [address] [phone]              Who to contact     If you have questions or need follow up information about today's clinic visit  "or your schedule please contact [facility] directly at [phone].  Normal or non-critical lab and imaging results will be communicated to you by MyChart, letter or phone within 4 business days after the clinic has received the results. If you do not hear from us within 7 days, please contact the clinic through [employer] or phone. If you have a critical or abnormal lab result, we will notify you by phone as soon as possible.  Submit refill requests through [employer] or call your pharmacy and they will forward the refill request to us. Please allow 3 business days for your refill to be completed.          Additional Information About Your Visit        [employer] Information     [employer] lets you send messages to your doctor, view your test results, renew your prescriptions, schedule appointments and more. To sign up, go to [email or web address] . Click on \"Log in\" on the left side of the screen, which will take you to the Welcome page. Then click on \"Sign up Now\" on the right side of the page.     You will be asked to enter the access code listed below, as well as some personal information. Please follow the directions to create your username and password.     Your access code is: [email or web address]  Expires: 2018  4:32 PM     Your access code will  in 90 days. If you need help or a new code, please call your [place] clinic or [phone].        Care EveryWhere ID     This is your Care EveryWhere ID. This could be used by other organizations to access your [place] medical records  [id number]        Your Vitals Were     Pulse Last Period                84 2017           Blood Pressure from Last 3 Encounters:   18 114/76   18 104/64   18 112/72    Weight from Last 3 Encounters:   18 255 lb (115.7 kg)   18 255 lb 9.6 oz (115.9 kg)   18 250 lb (113.4 kg)              Today, you had the following     No orders found for display       Primary Care Provider Office " Phone # Fax #    Jeremi [facility] [phone] [address]  [facility] [address]        Equal Access to Services     MARIE SHARMA : Hadii aad ku hadvincentpebbles Somelsisa, wamillyda luqadaha, qaortizta kaalmada sergio, liseth mauroin hayaagalen brucejosé manuel florence ajay meier. So [facility] [phone].    ATENCIÓN: Si habla español, tiene a castellanos disposición servicios gratuitos de asistencia lingüística. Llame al [phone].    We comply with applicable federal civil rights laws and [place] laws. We do not discriminate on the basis of race, color, national origin, age, disability, sex, sexual orientation, or gender identity.            Thank you!     Thank you for choosing [facility]  for your care. Our goal is always to provide you with excellent care. Hearing back from our patients is one way we can continue to improve our services. Please take a few minutes to complete the written survey that you may receive in the mail after your visit with us. Thank you!             Your Updated Medication List - Protect others around you: Learn how to safely use, store and throw away your medicines at www.disposemymeds.org.          This list is accurate as of 4/18/18  7:00 PM.  Always use your most recent med list.                   Brand Name Dispense Instructions for use Diagnosis    cholecalciferol 1000 UNIT tablet    vitamin D3    30 tablet    Take by mouth daily        prenatal multivitamin plus iron 27-0.8 MG Tabs per tablet     100 tablet    Take 1 tablet by mouth daily

## 2018-04-18 NOTE — NURSING NOTE
Chief Complaint   Patient presents with     Prenatal Care     baby active and moving - some cramping and back pain     38w0d    Initial /76 (BP Location: Right arm, Patient Position: Chair, Cuff Size: Adult Large)  Pulse 84  Wt 255 lb (115.7 kg)  LMP 08/05/2017 There is no height or weight on file to calculate BMI.  Medication Reconciliation: complete     Nurse assisted visit.  Elisabeth Vick MA.

## 2018-04-25 ENCOUNTER — PRENATAL OFFICE VISIT (OUTPATIENT)
Dept: OBGYN | Facility: CLINIC | Age: 23
End: 2018-04-25
Payer: COMMERCIAL

## 2018-04-25 VITALS — SYSTOLIC BLOOD PRESSURE: 116 MMHG | DIASTOLIC BLOOD PRESSURE: 74 MMHG | HEART RATE: 92 BPM | WEIGHT: 260 LBS

## 2018-04-25 DIAGNOSIS — O09.93 HIGH-RISK PREGNANCY IN THIRD TRIMESTER: Primary | ICD-10-CM

## 2018-04-25 PROCEDURE — 99207 ZZC COMPLICATED OB VISIT: CPT | Performed by: OBSTETRICS & GYNECOLOGY

## 2018-04-25 NOTE — NURSING NOTE
Chief Complaint   Patient presents with     Prenatal Care     baby active and moving - denies contractions     39w0d    Initial /74 (BP Location: Right arm, Patient Position: Chair, Cuff Size: Adult Large)  Pulse 92  Wt 260 lb (117.9 kg)  LMP 08/05/2017 There is no height or weight on file to calculate BMI.  Medication Reconciliation: complete     Nurse assisted visit.  Elisabeth Vick MA.

## 2018-04-25 NOTE — MR AVS SNAPSHOT
After Visit Summary   4/25/2018    Opal Hillman    MRN: [id number]           Patient Information     Date Of Birth          1995        Visit Information        Provider Department      4/25/2018 5:45 PM [facility]agon MD [facility]        Today's Diagnoses     High-risk pregnancy in third trimester    -  1       Follow-ups after your visit        Follow-up notes from your care team     Return in about 1 week (around 5/2/2018).      Your next 10 appointments already scheduled     Apr 27, 2018  2:00 PM CDT   (Arrive by 1:45 PM)   US OB SINGLE FOLLOW UP REPEAT with OXUS1   [facility]    [address] [address] [phone]           Please bring a list of your medicines (including vitamins, minerals and over-the-counter drugs). Also, tell your doctor about any allergies you may have. Wear comfortable clothes and leave your valuables at home.  If you re less than 20 weeks drink four 8-ounce glasses of fluid an hour before your exam. If you need to empty your bladder before your exam, try to release only a little urine. Then, drink another glass of fluid.  You may have up to two family members in the exam room. If you bring a small child, an adult must be there to care for him or her.  Please call the Imaging Department at your exam site with any questions.            Apr 30, 2018  7:00 PM CDT   ESTABLISHED PRENATAL with Frankie Aragon MD   [facility] ([facility])    [address]  [facility] [id number] [phone]              Who to contact     If you have questions or need follow up information about today's clinic visit or your schedule please contact [facility] directly at [phone].  Normal or non-critical lab and imaging results will be communicated to you by MyChart, letter or phone within 4 business days after the  "clinic has received the results. If you do not hear from us within 7 days, please contact the clinic through [employer] or phone. If you have a critical or abnormal lab result, we will notify you by phone as soon as possible.  Submit refill requests through [employer] or call your pharmacy and they will forward the refill request to us. Please allow 3 business days for your refill to be completed.          Additional Information About Your Visit        [employer]arachvr Information     [employer] lets you send messages to your doctor, view your test results, renew your prescriptions, schedule appointments and more. To sign up, go to [email or web address] . Click on \"Log in\" on the left side of the screen, which will take you to the Welcome page. Then click on \"Sign up Now\" on the right side of the page.     You will be asked to enter the access code listed below, as well as some personal information. Please follow the directions to create your username and password.     Your access code is: [email or web address]  Expires: 2018  4:32 PM     Your access code will  in 90 days. If you need help or a new code, please call your [place] clinic or [phone].        Care EveryWhere ID     This is your Care EveryWhere ID. This could be used by other organizations to access your [place] medical records  [id number]        Your Vitals Were     Pulse Last Period                92 2017           Blood Pressure from Last 3 Encounters:   18 116/74   18 114/76   18 104/64    Weight from Last 3 Encounters:   18 260 lb (117.9 kg)   18 255 lb (115.7 kg)   18 255 lb 9.6 oz (115.9 kg)              Today, you had the following     No orders found for display       Primary Care Provider Office Phone # Fax #    Jeremi [facility] [phone] [address]  [facility] [address]        Equal Access to Services     MARIE SHARMA AH: Hadii ayleen Florentino, " judit carpioalaltaf santiagomario connie lennygalen jhonny moss ah. So [facility] [phone].    ATENCIÓN: Si micheal herrera, tiene a castellanos disposición servicios gratuitos de asistencia lingüística. Eduardo al [phone].    We comply with applicable federal civil rights laws and [place] laws. We do not discriminate on the basis of race, color, national origin, age, disability, sex, sexual orientation, or gender identity.            Thank you!     Thank you for choosing [facility]  for your care. Our goal is always to provide you with excellent care. Hearing back from our patients is one way we can continue to improve our services. Please take a few minutes to complete the written survey that you may receive in the mail after your visit with us. Thank you!             Your Updated Medication List - Protect others around you: Learn how to safely use, store and throw away your medicines at www.disposemymeds.org.          This list is accurate as of 4/25/18  6:10 PM.  Always use your most recent med list.                   Brand Name Dispense Instructions for use Diagnosis    cholecalciferol 1000 UNIT tablet    vitamin D3    30 tablet    Take by mouth daily        prenatal multivitamin plus iron 27-0.8 MG Tabs per tablet     100 tablet    Take 1 tablet by mouth daily

## 2018-05-05 ENCOUNTER — NURSE TRIAGE (OUTPATIENT)
Dept: NURSING | Facility: CLINIC | Age: 23
End: 2018-05-05

## 2018-05-05 ENCOUNTER — HOSPITAL ENCOUNTER (OUTPATIENT)
Facility: CLINIC | Age: 23
Discharge: HOME | End: 2018-05-06
Attending: OBSTETRICS & GYNECOLOGY | Admitting: OBSTETRICS & GYNECOLOGY
Payer: COMMERCIAL

## 2018-05-05 PROCEDURE — G0463 HOSPITAL OUTPT CLINIC VISIT: HCPCS | Mod: 25

## 2018-05-05 PROCEDURE — 59025 FETAL NON-STRESS TEST: CPT
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
## 2018-05-05 NOTE — IP AVS SNAPSHOT
[facility] Labor and Delivery    [address]    [facility] [address]    Phone:  [phone]    Fax:  [phone]                                       After Visit Summary   5/5/2018    Opal Hillman    MRN: [id number]           After Visit Summary Signature Page     I have received my discharge instructions, and my questions have been answered. I have discussed any challenges I see with this plan with the nurse or doctor.    ..........................................................................................................................................  Patient/Patient Representative Signature      ..........................................................................................................................................  Patient Representative Print Name and Relationship to Patient    ..................................................               ................................................  Date                                            Time    ..........................................................................................................................................  Reviewed by Signature/Title    ...................................................              ..............................................  Date                                                            Time

## 2018-05-06 VITALS
SYSTOLIC BLOOD PRESSURE: 117 MMHG | HEIGHT: 64 IN | WEIGHT: 260 LBS | RESPIRATION RATE: 18 BRPM | BODY MASS INDEX: 44.39 KG/M2 | DIASTOLIC BLOOD PRESSURE: 64 MMHG | TEMPERATURE: 98 F

## 2018-05-06 LAB
ALT SERPL W P-5'-P-CCNC: 30 U/L (ref 0–50)
AST SERPL W P-5'-P-CCNC: 19 U/L (ref 0–45)
CREAT SERPL-MCNC: 0.46 MG/DL (ref 0.52–1.04)
ERYTHROCYTE [DISTWIDTH] IN BLOOD BY AUTOMATED COUNT: 13.4 % (ref 10–15)
GFR SERPL CREATININE-BSD FRML MDRD: >90 ML/MIN/1.7M2
HCT VFR BLD AUTO: 35 % (ref 35–47)
HGB BLD-MCNC: 11.8 G/DL (ref 11.7–15.7)
HGB F BLD QL KLEIH BETKE: NORMAL
MCH RBC QN AUTO: 31.2 PG (ref 26.5–33)
MCHC RBC AUTO-ENTMCNC: 33.7 G/DL (ref 31.5–36.5)
MCV RBC AUTO: 93 FL (ref 78–100)
PLATELET # BLD AUTO: 299 10E9/L (ref 150–450)
RBC # BLD AUTO: 3.78 10E12/L (ref 3.8–5.2)
URATE SERPL-MCNC: 5.3 MG/DL (ref 2.6–6)
WBC # BLD AUTO: 9.1 10E9/L (ref 4–11)

## 2018-05-06 PROCEDURE — 84550 ASSAY OF BLOOD/URIC ACID: CPT | Performed by: OBSTETRICS & GYNECOLOGY

## 2018-05-06 PROCEDURE — 84450 TRANSFERASE (AST) (SGOT): CPT | Performed by: OBSTETRICS & GYNECOLOGY

## 2018-05-06 PROCEDURE — 85027 COMPLETE CBC AUTOMATED: CPT | Performed by: OBSTETRICS & GYNECOLOGY

## 2018-05-06 PROCEDURE — 59025 FETAL NON-STRESS TEST: CPT

## 2018-05-06 PROCEDURE — 84460 ALANINE AMINO (ALT) (SGPT): CPT | Performed by: OBSTETRICS & GYNECOLOGY

## 2018-05-06 PROCEDURE — 85460 HEMOGLOBIN FETAL: CPT | Performed by: OBSTETRICS & GYNECOLOGY

## 2018-05-06 PROCEDURE — 82565 ASSAY OF CREATININE: CPT | Performed by: OBSTETRICS & GYNECOLOGY

## 2018-05-06 PROCEDURE — 36415 COLL VENOUS BLD VENIPUNCTURE: CPT | Performed by: OBSTETRICS & GYNECOLOGY

## 2018-05-06 PROCEDURE — G0463 HOSPITAL OUTPT CLINIC VISIT: HCPCS | Mod: 25

## 2018-05-06 RX ORDER — ONDANSETRON 2 MG/ML
4 INJECTION INTRAMUSCULAR; INTRAVENOUS EVERY 6 HOURS PRN
Status: DISCONTINUED | OUTPATIENT
Start: 2018-05-06 | End: 2018-05-06 | Stop reason: HOSPADM
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
## 2018-05-09 ENCOUNTER — TELEPHONE (OUTPATIENT)
Dept: OBGYN | Facility: CLINIC | Age: 23
End: 2018-05-09

## 2018-05-09 ENCOUNTER — PRENATAL OFFICE VISIT (OUTPATIENT)
Dept: OBGYN | Facility: CLINIC | Age: 23
End: 2018-05-09
Payer: COMMERCIAL

## 2018-05-09 VITALS
HEART RATE: 104 BPM | WEIGHT: 257 LBS | BODY MASS INDEX: 44.11 KG/M2 | SYSTOLIC BLOOD PRESSURE: 122 MMHG | DIASTOLIC BLOOD PRESSURE: 76 MMHG

## 2018-05-09 DIAGNOSIS — Z01.818 PRE-OPERATIVE EXAMINATION: ICD-10-CM

## 2018-05-09 DIAGNOSIS — O09.93 HIGH-RISK PREGNANCY IN THIRD TRIMESTER: Primary | ICD-10-CM

## 2018-05-09 DIAGNOSIS — O34.219 PREVIOUS CESAREAN DELIVERY, ANTEPARTUM CONDITION OR COMPLICATION: ICD-10-CM

## 2018-05-09 PROCEDURE — 99207 ZZC COMPLICATED OB VISIT: CPT | Performed by: OBSTETRICS & GYNECOLOGY

## 2018-05-09 NOTE — MR AVS SNAPSHOT
After Visit Summary   2018    Opal Hillman    MRN: [id number]           Patient Information     Date Of Birth          1995        Visit Information        Provider Department      2018 3:15 PM Frankie Aragon MD [facility]        Today's Diagnoses     High-risk pregnancy in third trimester    -  1    Previous  delivery, antepartum condition or complication        Pre-operative examination           Follow-ups after your visit        Follow-up notes from your care team     Return in about 2 days (around 2018).      Your next 10 appointments already scheduled     May 11, 2018   Procedure with Frankie Aragon MD   [facility] Labor and Delivery (--)    [address]  [facility] [id number] [phone]            May 23, 2018  3:30 PM CDT   SHORT with Frankie Aragon MD   [facility] ([facility])    [address]  [address]  [facility] [address] [phone]              Who to contact     If you have questions or need follow up information about today's clinic visit or your schedule please contact [facility] directly at [phone].  Normal or non-critical lab and imaging results will be communicated to you by MyChart, letter or phone within 4 business days after the clinic has received the results. If you do not hear from us within 7 days, please contact the clinic through MyChart or phone. If you have a critical or abnormal lab result, we will notify you by phone as soon as possible.  Submit refill requests through [employer] or call your pharmacy and they will forward the refill request to us. Please allow 3 business days for your refill to be completed.          Additional Information About Your Visit        MyChart Information     [employer] lets you send messages to your doctor, view your test results, renew your prescriptions, schedule appointments and more. To sign up, go to  [email or web address] . Click on \"Log in\" on the left side of the screen, which will take you to the Welcome page. Then click on \"Sign up Now\" on the right side of the page.     You will be asked to enter the access code listed below, as well as some personal information. Please follow the directions to create your username and password.     Your access code is: [email or web address]  Expires: 2018  1:00 AM     Your access code will  in 90 days. If you need help or a new code, please call your [place] clinic or [phone].        Care EveryWhere ID     This is your Care EveryWhere ID. This could be used by other organizations to access your [facility] medical records  [id number]        Your Vitals Were     Pulse Last Period BMI (Body Mass Index)             104 2017 44.11 kg/m2          Blood Pressure from Last 3 Encounters:   18 122/76   18 117/64   18 116/74    Weight from Last 3 Encounters:   18 257 lb (116.6 kg)   18 260 lb (117.9 kg)   18 260 lb (117.9 kg)              Today, you had the following     No orders found for display       Primary Care Provider Office Phone # Fax #    Jeremi [facility] [phone] [address] [facility] [address]        Equal Access to Services     MARIE SHARMA AH: Hadii yanely browno Somelissa, waaxda luqadaha, qaybta kaalmada adeegyada, liseth meier. So [facility] [phone].    ATENCIÓN: Si habla español, tiene a castellanos disposición servicios gratuitos de asistencia lingüística. Llame al [phone].    We comply with applicable federal civil rights laws and [place] laws. We do not discriminate on the basis of race, color, national origin, age, disability, sex, sexual orientation, or gender identity.            Thank you!     Thank you for choosing [facility]  for your care. Our goal is always to provide you with excellent care. Hearing back from our patients is " one way we can continue to improve our services. Please take a few minutes to complete the written survey that you may receive in the mail after your visit with us. Thank you!             Your Updated Medication List - Protect others around you: Learn how to safely use, store and throw away your medicines at www.disposemymeds.org.          This list is accurate as of 5/9/18  4:47 PM.  Always use your most recent med list.                   Brand Name Dispense Instructions for use Diagnosis    cholecalciferol 1000 UNIT tablet    vitamin D3    30 tablet    Take by mouth daily        prenatal multivitamin plus iron 27-0.8 MG Tabs per tablet     100 tablet    Take 1 tablet by mouth daily

## 2018-05-09 NOTE — NURSING NOTE
"Chief Complaint   Patient presents with     Prenatal Care     baby active but less - some contractions     41w0d    Initial /76 (BP Location: Right arm, Patient Position: Chair, Cuff Size: Adult Large)  Pulse 104  Wt 257 lb (116.6 kg)  LMP 08/05/2017  BMI 44.11 kg/m2 Estimated body mass index is 44.11 kg/(m^2) as calculated from the following:    Height as of 5/5/18: 5' 4\" (1.626 m).    Weight as of this encounter: 257 lb (116.6 kg).  Medication Reconciliation: complete     Nurse assisted visit.  Elisabeth Vick MA.        "

## 2018-05-09 NOTE — PROGRESS NOTES
IUP at 41 weeks     -NST (18) reactive     -not in labor/fetal macrosomia/history of C/S     -recommend C/S  C/S 18            [facility] [address] [facility]  [address]  [place] [id number] [phone]  Dept: [phone]    PRE-OP EVALUATION:  Today's date: 2018    Opal Hillman (: 1995) presents for pre-operative evaluation assessment         Patient has a Health Care Directive or Living Will:  NO    1. NO - Do you have a history of heart attack, stroke, stent, bypass or surgery on an artery in the head, neck, heart or legs?  2. NO - Do you ever have any pain or discomfort in your chest?  3. NO - Do you have a history of  Heart Failure?  4. NO - Are you troubled by shortness of breath when: walking on the level, up a slight hill or at night?  5. NO - Do you currently have a cold, bronchitis or other respiratory infection?  6. NO - Do you have a cough, shortness of breath or wheezing?  7. NO - Do you sometimes get pains in the calves of your legs when you walk?  8. NO - Do you or anyone in your family have previous history of blood clots?  9. NO - Do you or does anyone in your family have a serious bleeding problem such as prolonged bleeding following surgeries or cuts?  10. NO - Have you ever had problems with anemia or been told to take iron pills?  11. NO - Have you had any abnormal blood loss such as black, tarry or bloody stools, or abnormal vaginal bleeding?  12. NO - Have you ever had a blood transfusion?  13. NO - Have you or any of your relatives ever had problems with anesthesia?  14. NO - Do you have sleep apnea, excessive snoring or daytime drowsiness?  15. NO - Do you have any prosthetic heart valves?  16. NO - Do you have prosthetic joints?        HPI:     HPI related to upcoming procedure: previous C/S, fetal macrosomia          See problem list for active medical problems.  Problems all longstanding and stable, except as noted/documented.  See ROS  for pertinent symptoms related to these conditions.                                                                                                                                                          .    MEDICAL HISTORY:     Patient Active Problem List    Diagnosis Date Noted     High-risk pregnancy 10/18/2017     Priority: Medium     History of pre-eclampsia in prior pregnancy, currently pregnant 10/18/2017     Priority: Medium     Previous  delivery, antepartum condition or complication 10/18/2017     Priority: Medium     Prior fetal macrosomia in third trimester, antepartum 10/18/2017     Priority: Medium      Past Medical History:   Diagnosis Date     NO ACTIVE PROBLEMS      Past Surgical History:   Procedure Laterality Date      SECTION       Current Outpatient Prescriptions   Medication Sig Dispense Refill     cholecalciferol (VITAMIN D3) 1000 UNIT tablet Take by mouth daily 30 tablet      Prenatal Vit-Fe Fumarate-FA (PRENATAL MULTIVITAMIN PLUS IRON) 27-0.8 MG TABS per tablet Take 1 tablet by mouth daily 100 tablet 3     OTC products: None, except as noted above    No Known Allergies   Latex Allergy: NO    Social History   Substance Use Topics     Smoking status: Never Smoker     Smokeless tobacco: Never Used     Alcohol use No     History   Drug Use No       REVIEW OF SYSTEMS:   Constitutional, neuro, ENT, endocrine, pulmonary, cardiac, gastrointestinal, genitourinary, musculoskeletal, integument and psychiatric systems are negative, except as otherwise noted.    EXAM:   /76 (BP Location: Right arm, Patient Position: Chair, Cuff Size: Adult Large)  Pulse 104  Wt 257 lb (116.6 kg)  LMP 2017  BMI 44.11 kg/m2    GENERAL APPEARANCE: healthy, alert and no distress     EYES: EOMI, PERRL     HENT: ear canals and TM's normal and nose and mouth without ulcers or lesions     NECK: no adenopathy, no asymmetry, masses, or scars and thyroid normal to palpation     RESP: lungs clear to  auscultation - no rales, rhonchi or wheezes     CV: regular rates and rhythm, normal S1 S2, no S3 or S4 and no murmur, click or rub     ABDOMEN:  IUP at term     MS: extremities normal- no gross deformities noted, no evidence of inflammation in joints, FROM in all extremities.     SKIN: no suspicious lesions or rashes     NEURO: Normal strength and tone, sensory exam grossly normal, mentation intact and speech normal     PSYCH: mentation appears normal. and affect normal/bright     LYMPHATICS: No cervical adenopathy    DIAGNOSTICS:       Recent Labs   Lab Test  18   0027  18   1858   HGB  11.8  12.6   PLT  299  275   CR  0.46*   --         IMPRESSION:   IUP at 41 weeks     -history of previous C/S for macrosomia     -current macrosomic fetus     -not in labor at 41 weeks      ICD-10-CM    1. High-risk pregnancy in third trimester O09.93    2. Previous  delivery, antepartum condition or complication O34.219    3. Pre-operative examination Z01.818        RECOMMENDATIONS:   Repeat LTCS      -Risks, benefits, alternatives discussed; including but not limited to risk of infection, bleeding, damage to bowel/bladder and any other intra-abdominal viscera.  Risks also include risks of anesthesia and blood transfusion. Patient understands and agrees to planned procedure.

## 2018-05-10 ENCOUNTER — HOSPITAL ENCOUNTER (INPATIENT)
Facility: CLINIC | Age: 23
LOS: 4 days | Discharge: HOME | End: 2018-05-14
Attending: FAMILY MEDICINE | Admitting: FAMILY MEDICINE
Payer: COMMERCIAL

## 2018-05-10 ENCOUNTER — TELEPHONE (OUTPATIENT)
Dept: OBGYN | Facility: CLINIC | Age: 23
End: 2018-05-10

## 2018-05-10 DIAGNOSIS — Z98.891 S/P C-SECTION: Primary | ICD-10-CM

## 2018-05-10 LAB
ABO + RH BLD: NORMAL
ABO + RH BLD: NORMAL
ALBUMIN UR-MCNC: 30 MG/DL
APPEARANCE UR: ABNORMAL
BACTERIA #/AREA URNS HPF: ABNORMAL /HPF
BILIRUB UR QL STRIP: NEGATIVE
BLD GP AB SCN SERPL QL: NORMAL
BLOOD BANK CMNT PATIENT-IMP: NORMAL
COLOR UR AUTO: ABNORMAL
GLUCOSE UR STRIP-MCNC: NEGATIVE MG/DL
HGB BLD-MCNC: 12.7 G/DL (ref 11.7–15.7)
HGB UR QL STRIP: NEGATIVE
KETONES UR STRIP-MCNC: 20 MG/DL
LEUKOCYTE ESTERASE UR QL STRIP: NEGATIVE
MUCOUS THREADS #/AREA URNS LPF: PRESENT /LPF
NITRATE UR QL: NEGATIVE
PH UR STRIP: 6 PH (ref 5–7)
RBC #/AREA URNS AUTO: 3 /HPF (ref 0–2)
RUPTURE OF FETAL MEMBRANES BY ROM PLUS: POSITIVE
SOURCE: ABNORMAL
SP GR UR STRIP: 1.03 (ref 1–1.03)
SPECIMEN EXP DATE BLD: NORMAL
SQUAMOUS #/AREA URNS AUTO: 14 /HPF (ref 0–1)
UROBILINOGEN UR STRIP-MCNC: 2 MG/DL (ref 0–2)
WBC #/AREA URNS AUTO: 10 /HPF (ref 0–5)

## 2018-05-10 PROCEDURE — 86900 BLOOD TYPING SEROLOGIC ABO: CPT | Performed by: FAMILY MEDICINE

## 2018-05-10 PROCEDURE — 12000029 ZZH R&B OB INTERMEDIATE

## 2018-05-10 PROCEDURE — 36415 COLL VENOUS BLD VENIPUNCTURE: CPT | Performed by: FAMILY MEDICINE

## 2018-05-10 PROCEDURE — 85018 HEMOGLOBIN: CPT | Performed by: FAMILY MEDICINE

## 2018-05-10 PROCEDURE — 81001 URINALYSIS AUTO W/SCOPE: CPT | Performed by: FAMILY MEDICINE

## 2018-05-10 PROCEDURE — 84112 EVAL AMNIOTIC FLUID PROTEIN: CPT | Performed by: FAMILY MEDICINE

## 2018-05-10 PROCEDURE — 86850 RBC ANTIBODY SCREEN: CPT | Performed by: FAMILY MEDICINE

## 2018-05-10 PROCEDURE — 86780 TREPONEMA PALLIDUM: CPT | Performed by: FAMILY MEDICINE

## 2018-05-10 PROCEDURE — 86901 BLOOD TYPING SEROLOGIC RH(D): CPT | Performed by: FAMILY MEDICINE

## 2018-05-10 RX ORDER — METHYLERGONOVINE MALEATE 0.2 MG/ML
200 INJECTION INTRAVENOUS
Status: DISCONTINUED | OUTPATIENT
Start: 2018-05-10 | End: 2018-05-11

## 2018-05-10 RX ORDER — ONDANSETRON 2 MG/ML
4 INJECTION INTRAMUSCULAR; INTRAVENOUS EVERY 6 HOURS PRN
Status: DISCONTINUED | OUTPATIENT
Start: 2018-05-10 | End: 2018-05-11

## 2018-05-10 RX ORDER — OXYCODONE AND ACETAMINOPHEN 5; 325 MG/1; MG/1
1 TABLET ORAL
Status: DISCONTINUED | OUTPATIENT
Start: 2018-05-10 | End: 2018-05-11

## 2018-05-10 RX ORDER — ACETAMINOPHEN 325 MG/1
650 TABLET ORAL EVERY 4 HOURS PRN
Status: DISCONTINUED | OUTPATIENT
Start: 2018-05-10 | End: 2018-05-11

## 2018-05-10 RX ORDER — OXYTOCIN 10 [USP'U]/ML
10 INJECTION, SOLUTION INTRAMUSCULAR; INTRAVENOUS
Status: DISCONTINUED | OUTPATIENT
Start: 2018-05-10 | End: 2018-05-11

## 2018-05-10 RX ORDER — IBUPROFEN 800 MG/1
800 TABLET, FILM COATED ORAL
Status: DISCONTINUED | OUTPATIENT
Start: 2018-05-10 | End: 2018-05-11

## 2018-05-10 RX ORDER — OXYTOCIN/0.9 % SODIUM CHLORIDE 30/500 ML
100-340 PLASTIC BAG, INJECTION (ML) INTRAVENOUS CONTINUOUS PRN
Status: DISCONTINUED | OUTPATIENT
Start: 2018-05-10 | End: 2018-05-11

## 2018-05-10 RX ORDER — NALOXONE HYDROCHLORIDE 0.4 MG/ML
.1-.4 INJECTION, SOLUTION INTRAMUSCULAR; INTRAVENOUS; SUBCUTANEOUS
Status: DISCONTINUED | OUTPATIENT
Start: 2018-05-10 | End: 2018-05-11

## 2018-05-10 RX ORDER — CARBOPROST TROMETHAMINE 250 UG/ML
250 INJECTION, SOLUTION INTRAMUSCULAR
Status: DISCONTINUED | OUTPATIENT
Start: 2018-05-10 | End: 2018-05-11

## 2018-05-10 RX ORDER — SODIUM CHLORIDE, SODIUM LACTATE, POTASSIUM CHLORIDE, CALCIUM CHLORIDE 600; 310; 30; 20 MG/100ML; MG/100ML; MG/100ML; MG/100ML
INJECTION, SOLUTION INTRAVENOUS CONTINUOUS
Status: DISCONTINUED | OUTPATIENT
Start: 2018-05-10 | End: 2018-05-11

## 2018-05-10 NOTE — TELEPHONE ENCOUNTER
Agree and will follow   Dr. Lanie Conn, DO    Obstetrics and Gynecology  [facility] [place]

## 2018-05-10 NOTE — TELEPHONE ENCOUNTER
Called patient.  She still has contractions every 14 minutes, she still might be leaking fluid but she is not sure. She has been waiting for childcare for 5 hours.  I explained that we have been waiting for her for 5 hours and recommend to come in and can also bring the child with her in these situations. She also states that the contractions are painful.  She is NPO since 10 due to this.  Discussed risks of laboring with scarred uterus unmonitored.  She verbalized understanding and said she is on her way.   Reports + fetal movement     Dr. Lanie Conn, DO    Obstetrics and Gynecology  [facility] [place] [facility] [place]

## 2018-05-10 NOTE — PLAN OF CARE
"Data: [facility] to [facility]: 5/10/2018  4:56 PM.  Reason for maternal/fetal assessment is uterine contractions and rule rupture of membranes. Patient reports she noticed a \"pop sensation\" when going to the bathroom early this morning around 0300 and also stated she lost her mucous plug.  Patient is a .  Prenatal record reviewed. Patient reports she had pre-eclampsia with first pregnancy and primary C/S.  Gestational Age 41w1d. VSS. Fetal movement present. Patient denies abdominal pain, pelvic pressure, nausea, vomiting, headache, visual disturbances, epigastric or URQ pain. Support person is present.   Action: Verbal consent for EFM. Triage assessment completed. Bill of rights reviewed.  Response: Patient verbalized agreement with plan. Will contact Dr Lanie Conn with update and further orders.  "

## 2018-05-10 NOTE — IP AVS SNAPSHOT
MRN:[id number]                      After Visit Summary   5/10/2018    Opal Hillman    MRN: [id number]           Thank you!     Thank you for choosing [facility] for your care. Our goal is always to provide you with excellent care. Hearing back from our patients is one way we can continue to improve our services. Please take a few minutes to complete the written survey that you may receive in the mail after you visit. If you would like to speak to someone directly about your visit please contact Patient Relations at [phone]. Thank you!          Patient Information     Date Of Birth          1995        About your hospital stay     You were admitted on:  May 10, 2018 You last received care in the:  [facility]    You were discharged on:  May 14, 2018       Who to Call     For medical emergencies, please call 911.  For non-urgent questions about your medical care, please call your primary care provider or clinic, [phone]  For questions related to your surgery, please call your surgery clinic        Attending Provider     Provider Specialty    Lanie Conn DO OB/Gyn    Frankie Aragon MD OB/Gyn       Primary Care Provider Office Phone # Fax #    [facility] [phone]      After Care Instructions     Activity       Review discharge instructions            Diet       Resume previous diet            Discharge Instructions - Postpartum visit       Schedule postpartum visit with your provider and return to clinic in 1-2 weeks.                  Your next 10 appointments already scheduled     May 23, 2018  3:30 PM CDT   SHORT with Frankie Aragon MD   [facility] ([facility])    [address] [place] [id number] [phone]              Further instructions from your care team       Postop  Birth Instructions  Please make an appointment to follow up  with your primary OB in clinic in 1-2 weeks.    [facility] Consultant:  [phone]    Activity       Do not lift more than 10 pounds for 6 weeks after surgery.  Ask family and friends for help when you need it.    No driving until you have stopped taking your pain medications (usually two weeks after surgery).    No heavy exercise or activity for 6 weeks.  Don't do anything that will put a strain on your surgery site.    Don't strain when using the toilet.  Your care team may prescribe a stool softener if you have problems with your bowel movements.     To care for your incision:       Keep the incision clean and dry.    Do not soak your incision in water. No swimming or hot tubs until it has fully healed. You may soak in the bathtub if the water level is below your incision.    Do not use peroxide, gel, cream, lotion, or ointment on your incision.    Adjust your clothes to avoid pressure on your surgery site (check the elastic in your underwear for example).     You may see a small amount of clear or pink drainage and this is normal.  Check with your health care provider:       If the drainage increases or has an odor.    If the incision reddens, you have swelling, or develop a rash.    If you have increased pain and the medicine we prescribed doesn't help.    If you have a fever above 100.4 F (38 C) with or without chills when placing thermometer under your tongue.   The area around your incision (surgery wound), will feel numb.  This is normal. The numbness should go away in less than a year.     Keep your hands clean:  Always wash your hands before touching your incision (surgery wound). This helps reduce your risk of infection. If your hands aren't dirty, you may use an alcohol hand-rub to clean your hands. Keep your nails clean and short.    Call your healthcare provider if you have any of these symptoms:       You soak a sanitary pad with blood within 1 hour, or you see blood clots larger  "than a golf ball.    Bleeding that lasts more than 6 weeks.    Vaginal discharge that smells bad.    Severe pain, cramping or tenderness in your lower belly area.    A need to urinate more frequently (use the toilet more often), more urgently (use the toilet very quickly), or it burns when you urinate.    Nausea and vomiting.    Redness, swelling or pain around a vein in your leg.    Problems breastfeeding or a red or painful area on your breast.    Chest pain and cough or are gasping for air.    Problems with coping with sadness, anxiety or depression. If you have concerns about hurting yourself or the baby, call your provider immediately.      You have questions or concerns after you return home.            Pending Results     No orders found from 5/8/2018 to 5/11/2018.            Statement of Approval     Ordered          05/14/18 0907  I have reviewed and agree with all the recommendations and orders detailed in this document.  EFFECTIVE NOW     Approved and electronically signed by:  Frankie Aragon MD             Admission Information     Date & Time Provider Department Dept. Phone    5/10/2018 Frankie Aragon MD [facility] [phone]      Your Vitals Were     Blood Pressure Pulse Temperature Respirations Height Weight    133/89 78 98  F (36.7  C) (Oral) 16 1.626 m (5' 4\") 116.6 kg (257 lb)    Last Period Pulse Oximetry BMI (Body Mass Index)             08/05/2017 98% 44.11 kg/m2         MyChart Information     noodls lets you send messages to your doctor, view your test results, renew your prescriptions, schedule appointments and more. To sign up, go to [email or web address] . Click on \"Log in\" on the left side of the screen, which will take you to the Welcome page. Then click on \"Sign up Now\" on the right side of the page.     You will be asked to enter the access code listed below, as well as some personal information. Please follow the directions to create your username and " password.     Your access code is: [email or web address]  Expires: 2018  1:00 AM     Your access code will  in 90 days. If you need help or a new code, please call your [facility] clinic or [phone].        Care EveryWhere ID     This is your Care EveryWhere ID. This could be used by other organizations to access your [facility] medical records  [id number]        Equal Access to Services     MARIE SHARMA : Hadii yanely newton hadasho Soomaali, waaxda luqadaha, qaybta kaalmada adeegyada, liseth rosales haygarethgalen muirpavelcristy moss . So [facility] [phone].    ATENCIÓN: Si habla español, tiene a castellanos disposición servicios gratuitos de asistencia lingüística. Llame al [phone].    We comply with applicable federal civil rights laws and [place] laws. We do not discriminate on the basis of race, color, national origin, age, disability, sex, sexual orientation, or gender identity.               Review of your medicines      START taking        Dose / Directions    ferrous gluconate 324 (38 Fe) MG tablet   Commonly known as:  FERGON        Dose:  324 mg   Take 1 tablet (324 mg) by mouth daily (with breakfast)   Quantity:  100 tablet   Refills:  0       ibuprofen 600 MG tablet   Commonly known as:  ADVIL/MOTRIN   Notes to Patient:  Take with food        Dose:  600 mg   Take 1 tablet (600 mg) by mouth every 6 hours as needed for other (carmping)   Quantity:  120 tablet   Refills:  0       oxyCODONE IR 5 MG tablet   Commonly known as:  ROXICODONE   Notes to Patient:  Can cause constipation        Dose:  5-10 mg   Take 1-2 tablets (5-10 mg) by mouth every 3 hours as needed for other (pain control or improvement in physical function. Hold dose for analgesic side effects.)   Quantity:  30 tablet   Refills:  0       senna-docusate 8.6-50 MG per tablet   Commonly known as:  SENOKOT-S;PERICOLACE        Dose:  1 tablet   Take 1 tablet by mouth 2 times daily as needed for constipation   Quantity:  100 tablet   Refills:  0         CONTINUE  these medicines which have NOT CHANGED        Dose / Directions    cholecalciferol 1000 UNIT tablet   Commonly known as:  vitamin D3        Take by mouth daily   Quantity:  30 tablet   Refills:  0       prenatal multivitamin plus iron 27-0.8 MG Tabs per tablet        Dose:  1 tablet   Take 1 tablet by mouth daily   Quantity:  100 tablet   Refills:  3            Where to get your medicines      Some of these will need a paper prescription and others can be bought over the counter. Ask your nurse if you have questions.     Bring a paper prescription for each of these medications     ferrous gluconate 324 (38 Fe) MG tablet    ibuprofen 600 MG tablet    oxyCODONE IR 5 MG tablet    senna-docusate 8.6-50 MG per tablet                Protect others around you: Learn how to safely use, store and throw away your medicines at www.disposemymeds.org.        Information about OPIOIDS     PRESCRIPTION OPIOIDS: WHAT YOU NEED TO KNOW   You have a prescription for an opioid (narcotic) pain medicine. Opioids can cause addiction. If you have a history of chemical dependency of any type, you are at a higher risk of becoming addicted to opioids. Only take this medicine after all other options have been tried. Take it for as short a time and as few doses as possible.     Do not:    Drive. If you drive while taking these medicines, you could be arrested for driving under the influence (DUI).    Operate heavy machinery    Do any other dangerous activities while taking these medicines.     Drink any alcohol while taking these medicines.      Take with any other medicines that contain acetaminophen. Read all labels carefully. Look for the word  acetaminophen  or  Tylenol.  Ask your pharmacist if you have questions or are unsure.    Store your pills in a secure place, locked if possible. We will not replace any lost or stolen medicine. If you don t finish your medicine, please throw away (dispose) as directed by your pharmacist. The [place]  Pollution Control Agency has more information about safe disposal: https://www.pca.[facility].[place].us/living-green/managing-unwanted-medications    All opioids tend to cause constipation. Drink plenty of water and eat foods that have a lot of fiber, such as fruits, vegetables, prune juice, apple juice and high-fiber cereal. Take a laxative (Miralax, milk of magnesia, Colace, Senna) if you don t move your bowels at least every other day.              Medication List: This is a list of all your medications and when to take them. Check marks below indicate your daily home schedule. Keep this list as a reference.      Medications           Morning Afternoon Evening Bedtime As Needed    cholecalciferol 1000 UNIT tablet   Commonly known as:  vitamin D3   Take by mouth daily                                   ferrous gluconate 324 (38 Fe) MG tablet   Commonly known as:  FERGON   Take 1 tablet (324 mg) by mouth daily (with breakfast)                                   ibuprofen 600 MG tablet   Commonly known as:  ADVIL/MOTRIN   Take 1 tablet (600 mg) by mouth every 6 hours as needed for other (carmping)   Last time this was given:  800 mg on 5/14/2018  8:30 AM   Notes to Patient:  Take with food                                   oxyCODONE IR 5 MG tablet   Commonly known as:  ROXICODONE   Take 1-2 tablets (5-10 mg) by mouth every 3 hours as needed for other (pain control or improvement in physical function. Hold dose for analgesic side effects.)   Notes to Patient:  Can cause constipation                                   prenatal multivitamin plus iron 27-0.8 MG Tabs per tablet   Take 1 tablet by mouth daily   Last time this was given:  1 tablet on 5/13/2018  5:01 PM                                   senna-docusate 8.6-50 MG per tablet   Commonly known as:  SENOKOT-S;PERICOLACE   Take 1 tablet by mouth 2 times daily as needed for constipation   Last time this was given:  1 tablet on 5/13/2018  6:49 PM

## 2018-05-10 NOTE — H&P
[facility] Labor and Delivery History and Physical    Opal Hillman MRN# [id number]   Age: 22 year old YOB: 1995     Date of Admission:  5/10/2018    Primary care provider: Toni, Jeremi Milton           Chief Complaint:   Opal Hillman is a 22 year old female who is  @ 41w1d pregnant and being admitted for latent labor management with history of  section.  She desires , but also has risks of macrosomia and is post-dates and is obese.  I have discussed with her that due to these risk factors, I recommend repeat  section.  The patient strongly desires . She and her partner will discuss their options.     I have discussed in detail risk of uterine rupture, fetal death,  hysterectomy, infection, hemorrhage/bleeding, birth injury, fetal asphyxia resulting in brain injury or fetal death.     We will observe and wait from the ROM test and obtain the operative note from previous  section.  GBS negative.           Pregnancy history:     OBSTETRIC HISTORY:    Obstetric History       T1      L1     SAB0   TAB0   Ectopic0   Multiple0   Live Births1       # Outcome Date GA Lbr Raul/2nd Weight Sex Delivery Anes PTL Lv   2 Current            1 Term 16 41w3d  4.167 kg (9 lb 3 oz) F CS-LTranv EPI,Spinal N WILTON      Name: Kayla      Complications: Preeclampsia/Hypertension          EDC: Estimated Date of Delivery: May 2, 2018    Prenatal Labs:   Lab Results   Component Value Date    ABO A 10/05/2017    RH Pos 10/05/2017    AS Neg 10/05/2017    HEPBANG Nonreactive 10/05/2017    CHPCRT Negative 10/18/2017    GCPCRT Negative 10/18/2017    TREPAB Negative 2018    HGB 11.8 2018       GBS Status:   Lab Results   Component Value Date    GBS Negative 2018       Active Problem List  Patient Active Problem List   Diagnosis     High-risk pregnancy     History of pre-eclampsia in prior pregnancy, currently pregnant      "Previous  delivery, antepartum condition or complication     Prior fetal macrosomia in third trimester, antepartum     Indication for care in labor or delivery       Medication Prior to Admission  Prescriptions Prior to Admission   Medication Sig Dispense Refill Last Dose     cholecalciferol (VITAMIN D3) 1000 UNIT tablet Take by mouth daily 30 tablet  Past Month at Unknown time     Prenatal Vit-Fe Fumarate-FA (PRENATAL MULTIVITAMIN PLUS IRON) 27-0.8 MG TABS per tablet Take 1 tablet by mouth daily 100 tablet 3 Past Week at Unknown time   .        Maternal Past Medical History:     Past Medical History:   Diagnosis Date     NO ACTIVE PROBLEMS                        Family History:   This patient has no significant family history            Social History:   This patient has no significant social history         Review of Systems:   CONSTITUTIONAL: NEGATIVE for fever, chills, change in weight  INTEGUMENTARY/SKIN: NEGATIVE for worrisome rashes, moles or lesions  EYES: NEGATIVE for vision changes or irritation  ENT/MOUTH: NEGATIVE for ear, mouth and throat problems  RESP: NEGATIVE for significant cough or SOB  BREAST: NEGATIVE for masses, tenderness or discharge  CV: NEGATIVE for chest pain, palpitations or peripheral edema  GI: NEGATIVE for nausea, abdominal pain, heartburn, or change in bowel habits  : NEGATIVE for frequency, dysuria, or hematuria  MUSCULOSKELETAL: NEGATIVE for significant arthralgias or myalgia  NEURO: NEGATIVE for weakness, dizziness or paresthesias  ENDOCRINE: NEGATIVE for temperature intolerance, skin/hair changes  HEME: NEGATIVE for bleeding problems  PSYCHIATRIC: NEGATIVE for changes in mood or affect          Physical Exam:     Vitals were reviewed  Patient Vitals for the past 8 hrs:   Temp Temp src Resp Height Weight   05/10/18 1714 98.7  F (37.1  C) Oral 18 1.626 m (5' 4\") 116.6 kg (257 lb)     Constitutional: Awake, alert, cooperative, no apparent distress, and appears stated " age.  Eyes: Lids and lashes normal, pupils equal, round and reactive to light, extra ocular muscles intact, sclera clear, conjunctiva normal.  ENT: Normocephalic, without obvious abnormality, atramatic, sinuses nontender on palpation, external ears without lesions, oral pharynx with moist mucus membranes, tonsils without erythema or exudates, gums normal and good dentition.  Neck: Supple, symmetrical, trachea midline, no adenopathy, thyroid symmetric, not enlarged and no tenderness, skin normal.  Hematologic / Lymphatic: No cervical lymphadenopathy and no supraclavicular lymphadenopathy.  Back: Symmetric, no curvature, spinous processes are non-tender on palpation, paraspinous muscles are non-tender on palpation, no costal vertebral tenderness.  Lungs: No increased work of breathing, good air exchange, clear to auscultation bilaterally, no crackles or wheezing.  Cardiovascular: Regular rate and rhythm, normal S1 and S2, no S3 or S4, and no murmur noted.  Chest / Breast: Breasts symmetrical, skin without lesion(s), no nipple retraction or dimpling, no nipple discharge, no masses palpated, no axillary or supraclavicular adenopathy.  Abdomen: No scars, normal bowel sounds, soft, non-distended, non-tender, no masses palpated, no hepatosplenomegally.  Genitourinary: No urethral discharge, normal external genitalia, no hernia.  Musculoskeletal: No redness, warmth, or swelling of the joints.  Full range of motion noted.  Motor strength is 5 out of 5 all extremities bilaterally.  Tone is normal.  Neurologic: Awake, alert, oriented to name, place and time.  Cranial nerves II-XII are grossly intact.  Motor is 5 out of 5 bilaterally.  Cerebellar finger to nose, heel to shin intact.  Sensory is intact.  Babinski down going, Romberg negative, and gait is normal.  Neuropsychiatric: Normal affect, mood, orientation, memory and insight.  Skin: No rashes, erythema, pallor, petechia or purpura.   Cervix:   Membranes:  intact   Dilation: 2   Effacement: 30%   Station:-2   Consistency: average   Position: Mid  Presentation:Cephalic  Fetal Heart Rate Tracing: Tier 1 (normal)  Tocometer: frequency q 7-10 minutes                       Assessment:   Opal Hillman is a 22 year old female who is  @ 41w1d pregnant and being admitted for latent labor management with history of  section.  She desires , but also has risks of macrosomia and is post-dates and is obese.  I have discussed with her that due to these risk factors, I recommend repeat  section.  The patient strongly desires . She and her partner will discuss their options.     I have discussed in detail risk of uterine rupture, fetal death,  hysterectomy, infection, hemorrhage/bleeding, birth injury, fetal asphyxia resulting in brain injury or fetal death.     We will observe and wait from the ROM test and obtain the operative note from previous  section.  GBS negative.           Plan:   Admit - for observation  Obtain operative note   Await ROM test    Lanie Conn DO

## 2018-05-10 NOTE — PROGRESS NOTES
Patient with + ROM   Awaiting operative note     Dr. Lanie Conn, DO    Obstetrics and Gynecology  [facility] [place]

## 2018-05-10 NOTE — PROVIDER NOTIFICATION
05/10/18 1750   Provider Notification   Provider Name/Title Dr. Conn   Method of Notification At Bedside   Request Evaluate in Person   Notification Reason Status Update;SVE     Dr. Conn here at the bedside to discuss options with patient in regard to delivery. Discussing risks and benefits of TOLAC. Release of medical information from [facility] signed by patient, for operative note.

## 2018-05-10 NOTE — IP AVS SNAPSHOT
[facility]    [address]    [facility] [address]    Phone:  [phone]    Fax:  [phone]                                       After Visit Summary   5/10/2018    Opal Hillman    MRN: [id number]           After Visit Summary Signature Page     I have received my discharge instructions, and my questions have been answered. I have discussed any challenges I see with this plan with the nurse or doctor.    ..........................................................................................................................................  Patient/Patient Representative Signature      ..........................................................................................................................................  Patient Representative Print Name and Relationship to Patient    ..................................................               ................................................  Date                                            Time    ..........................................................................................................................................  Reviewed by Signature/Title    ...................................................              ..............................................  Date                                                            Time

## 2018-05-10 NOTE — TELEPHONE ENCOUNTER
41w1d  G 2 P1.  Estimated Date of Delivery: May 2, 2018      Pt calls in and reports possible sx labor, they started 2 or 3 am.  Cxts:  8-10 min apart, lasting 1 min.   BOW (if not intact what time):  Thinks she passed mucus plug, but really watery at about 3am.  Slight leakage since then- watery  Fetal movement last felt: has had a lot of fetal movement today  Position of baby at last OV:  cephalic  Dilated at last OV?: 1.0 on 18.  GBS:  Neg  Blood type:  A pos  Hx of deliveries: Past CS 16, Preeclampsia  Scheduled for C section?: YES, tomorrow. Pt wanted to do  but was told she didn't have any progression so she would have to get CS   Sent to L&D?:  Yes, pt will gop to L&D for eval.  L&D aware.    This message was routed to KT, on call provider.  Paged on call doc as well.    Vane CHANDLER R.N.  [facility]

## 2018-05-10 NOTE — H&P (VIEW-ONLY)
IUP at 41 weeks     -NST (18) reactive     -not in labor/fetal macrosomia/history of C/S     -recommend C/S  C/S 18            [facility] [place] [facility]  [address]  [place] [id number] [phone]  Dept: [phone]    PRE-OP EVALUATION:  Today's date: 2018    Opal Hillman (: 1995) presents for pre-operative evaluation assessment         Patient has a Health Care Directive or Living Will:  NO    1. NO - Do you have a history of heart attack, stroke, stent, bypass or surgery on an artery in the head, neck, heart or legs?  2. NO - Do you ever have any pain or discomfort in your chest?  3. NO - Do you have a history of  Heart Failure?  4. NO - Are you troubled by shortness of breath when: walking on the level, up a slight hill or at night?  5. NO - Do you currently have a cold, bronchitis or other respiratory infection?  6. NO - Do you have a cough, shortness of breath or wheezing?  7. NO - Do you sometimes get pains in the calves of your legs when you walk?  8. NO - Do you or anyone in your family have previous history of blood clots?  9. NO - Do you or does anyone in your family have a serious bleeding problem such as prolonged bleeding following surgeries or cuts?  10. NO - Have you ever had problems with anemia or been told to take iron pills?  11. NO - Have you had any abnormal blood loss such as black, tarry or bloody stools, or abnormal vaginal bleeding?  12. NO - Have you ever had a blood transfusion?  13. NO - Have you or any of your relatives ever had problems with anesthesia?  14. NO - Do you have sleep apnea, excessive snoring or daytime drowsiness?  15. NO - Do you have any prosthetic heart valves?  16. NO - Do you have prosthetic joints?        HPI:     HPI related to upcoming procedure: previous C/S, fetal macrosomia          See problem list for active medical problems.  Problems all longstanding and stable, except as noted/documented.  See ROS  for pertinent symptoms related to these conditions.                                                                                                                                                          .    MEDICAL HISTORY:     Patient Active Problem List    Diagnosis Date Noted     High-risk pregnancy 10/18/2017     Priority: Medium     History of pre-eclampsia in prior pregnancy, currently pregnant 10/18/2017     Priority: Medium     Previous  delivery, antepartum condition or complication 10/18/2017     Priority: Medium     Prior fetal macrosomia in third trimester, antepartum 10/18/2017     Priority: Medium      Past Medical History:   Diagnosis Date     NO ACTIVE PROBLEMS      Past Surgical History:   Procedure Laterality Date      SECTION       Current Outpatient Prescriptions   Medication Sig Dispense Refill     cholecalciferol (VITAMIN D3) 1000 UNIT tablet Take by mouth daily 30 tablet      Prenatal Vit-Fe Fumarate-FA (PRENATAL MULTIVITAMIN PLUS IRON) 27-0.8 MG TABS per tablet Take 1 tablet by mouth daily 100 tablet 3     OTC products: None, except as noted above    No Known Allergies   Latex Allergy: NO    Social History   Substance Use Topics     Smoking status: Never Smoker     Smokeless tobacco: Never Used     Alcohol use No     History   Drug Use No       REVIEW OF SYSTEMS:   Constitutional, neuro, ENT, endocrine, pulmonary, cardiac, gastrointestinal, genitourinary, musculoskeletal, integument and psychiatric systems are negative, except as otherwise noted.    EXAM:   /76 (BP Location: Right arm, Patient Position: Chair, Cuff Size: Adult Large)  Pulse 104  Wt 257 lb (116.6 kg)  LMP 2017  BMI 44.11 kg/m2    GENERAL APPEARANCE: healthy, alert and no distress     EYES: EOMI, PERRL     HENT: ear canals and TM's normal and nose and mouth without ulcers or lesions     NECK: no adenopathy, no asymmetry, masses, or scars and thyroid normal to palpation     RESP: lungs clear to  auscultation - no rales, rhonchi or wheezes     CV: regular rates and rhythm, normal S1 S2, no S3 or S4 and no murmur, click or rub     ABDOMEN:  IUP at term     MS: extremities normal- no gross deformities noted, no evidence of inflammation in joints, FROM in all extremities.     SKIN: no suspicious lesions or rashes     NEURO: Normal strength and tone, sensory exam grossly normal, mentation intact and speech normal     PSYCH: mentation appears normal. and affect normal/bright     LYMPHATICS: No cervical adenopathy    DIAGNOSTICS:       Recent Labs   Lab Test  18   0027  18   1858   HGB  11.8  12.6   PLT  299  275   CR  0.46*   --         IMPRESSION:   IUP at 41 weeks     -history of previous C/S for macrosomia     -current macrosomic fetus     -not in labor at 41 weeks      ICD-10-CM    1. High-risk pregnancy in third trimester O09.93    2. Previous  delivery, antepartum condition or complication O34.219    3. Pre-operative examination Z01.818        RECOMMENDATIONS:   Repeat LTCS      -Risks, benefits, alternatives discussed; including but not limited to risk of infection, bleeding, damage to bowel/bladder and any other intra-abdominal viscera.  Risks also include risks of anesthesia and blood transfusion. Patient understands and agrees to planned procedure.

## 2018-05-11 ENCOUNTER — SURGERY (OUTPATIENT)
Age: 23
End: 2018-05-11

## 2018-05-11 ENCOUNTER — ANESTHESIA EVENT (OUTPATIENT)
Dept: OBGYN | Facility: CLINIC | Age: 23
End: 2018-05-11
Payer: COMMERCIAL

## 2018-05-11 ENCOUNTER — ANESTHESIA (OUTPATIENT)
Dept: OBGYN | Facility: CLINIC | Age: 23
End: 2018-05-11
Payer: COMMERCIAL

## 2018-05-11 PROBLEM — Z98.891 S/P C-SECTION: Status: ACTIVE | Noted: 2018-05-11

## 2018-05-11 LAB — T PALLIDUM AB SER QL: NONREACTIVE

## 2018-05-11 PROCEDURE — 71000012 ZZH RECOVERY PHASE 1 LEVEL 1 FIRST HR: Performed by: OBSTETRICS & GYNECOLOGY

## 2018-05-11 PROCEDURE — 25000132 ZZH RX MED GY IP 250 OP 250 PS 637: Performed by: FAMILY MEDICINE

## 2018-05-11 PROCEDURE — 12000031 ZZH R&B OB CRITICAL

## 2018-05-11 PROCEDURE — 27210794 ZZH OR GENERAL SUPPLY STERILE: Performed by: OBSTETRICS & GYNECOLOGY

## 2018-05-11 PROCEDURE — 25000128 H RX IP 250 OP 636: Performed by: FAMILY MEDICINE

## 2018-05-11 PROCEDURE — 59510 CESAREAN DELIVERY: CPT | Performed by: OBSTETRICS & GYNECOLOGY

## 2018-05-11 PROCEDURE — 36000056 ZZH SURGERY LEVEL 3 1ST 30 MIN: Performed by: OBSTETRICS & GYNECOLOGY

## 2018-05-11 PROCEDURE — 36000058 ZZH SURGERY LEVEL 3 EA 15 ADDTL MIN: Performed by: OBSTETRICS & GYNECOLOGY

## 2018-05-11 PROCEDURE — 25000128 H RX IP 250 OP 636: Performed by: NURSE ANESTHETIST, CERTIFIED REGISTERED

## 2018-05-11 PROCEDURE — 71000013 ZZH RECOVERY PHASE 1 LEVEL 1 EA ADDTL HR: Performed by: OBSTETRICS & GYNECOLOGY

## 2018-05-11 PROCEDURE — 25000128 H RX IP 250 OP 636: Performed by: OBSTETRICS & GYNECOLOGY

## 2018-05-11 PROCEDURE — 25000125 ZZHC RX 250: Performed by: OBSTETRICS & GYNECOLOGY

## 2018-05-11 PROCEDURE — 37000009 ZZH ANESTHESIA TECHNICAL FEE, EACH ADDTL 15 MIN: Performed by: OBSTETRICS & GYNECOLOGY

## 2018-05-11 PROCEDURE — 25000125 ZZHC RX 250: Performed by: NURSE ANESTHETIST, CERTIFIED REGISTERED

## 2018-05-11 PROCEDURE — 37000008 ZZH ANESTHESIA TECHNICAL FEE, 1ST 30 MIN: Performed by: OBSTETRICS & GYNECOLOGY

## 2018-05-11 PROCEDURE — 27210995 ZZH RX 272: Performed by: OBSTETRICS & GYNECOLOGY

## 2018-05-11 RX ORDER — CEFAZOLIN SODIUM 2 G/100ML
2 INJECTION, SOLUTION INTRAVENOUS
Status: COMPLETED | OUTPATIENT
Start: 2018-05-11 | End: 2018-05-11

## 2018-05-11 RX ORDER — OXYTOCIN/0.9 % SODIUM CHLORIDE 30/500 ML
PLASTIC BAG, INJECTION (ML) INTRAVENOUS PRN
Status: DISCONTINUED | OUTPATIENT
Start: 2018-05-11 | End: 2018-05-11

## 2018-05-11 RX ORDER — DIPHENHYDRAMINE HCL 25 MG
25 CAPSULE ORAL EVERY 6 HOURS PRN
Status: DISCONTINUED | OUTPATIENT
Start: 2018-05-11 | End: 2018-05-14 | Stop reason: HOSPADM

## 2018-05-11 RX ORDER — OXYTOCIN/0.9 % SODIUM CHLORIDE 30/500 ML
100 PLASTIC BAG, INJECTION (ML) INTRAVENOUS CONTINUOUS
Status: DISCONTINUED | OUTPATIENT
Start: 2018-05-11 | End: 2018-05-14 | Stop reason: HOSPADM

## 2018-05-11 RX ORDER — HYDROMORPHONE HYDROCHLORIDE 1 MG/ML
.3-.5 INJECTION, SOLUTION INTRAMUSCULAR; INTRAVENOUS; SUBCUTANEOUS EVERY 5 MIN PRN
Status: DISCONTINUED | OUTPATIENT
Start: 2018-05-11 | End: 2018-05-11 | Stop reason: HOSPADM

## 2018-05-11 RX ORDER — SODIUM CHLORIDE, SODIUM LACTATE, POTASSIUM CHLORIDE, CALCIUM CHLORIDE 600; 310; 30; 20 MG/100ML; MG/100ML; MG/100ML; MG/100ML
INJECTION, SOLUTION INTRAVENOUS CONTINUOUS
Status: DISCONTINUED | OUTPATIENT
Start: 2018-05-11 | End: 2018-05-11

## 2018-05-11 RX ORDER — OXYTOCIN/0.9 % SODIUM CHLORIDE 30/500 ML
340 PLASTIC BAG, INJECTION (ML) INTRAVENOUS CONTINUOUS PRN
Status: DISCONTINUED | OUTPATIENT
Start: 2018-05-11 | End: 2018-05-14 | Stop reason: HOSPADM

## 2018-05-11 RX ORDER — CITRIC ACID/SODIUM CITRATE 334-500MG
30 SOLUTION, ORAL ORAL
Status: COMPLETED | OUTPATIENT
Start: 2018-05-11 | End: 2018-05-11

## 2018-05-11 RX ORDER — AMOXICILLIN 250 MG
2 CAPSULE ORAL 2 TIMES DAILY PRN
Status: DISCONTINUED | OUTPATIENT
Start: 2018-05-11 | End: 2018-05-14 | Stop reason: HOSPADM

## 2018-05-11 RX ORDER — KETOROLAC TROMETHAMINE 30 MG/ML
30 INJECTION, SOLUTION INTRAMUSCULAR; INTRAVENOUS EVERY 6 HOURS
Status: DISCONTINUED | OUTPATIENT
Start: 2018-05-11 | End: 2018-05-12

## 2018-05-11 RX ORDER — ACETAMINOPHEN 325 MG/1
975 TABLET ORAL EVERY 8 HOURS
Status: DISPENSED | OUTPATIENT
Start: 2018-05-11 | End: 2018-05-14

## 2018-05-11 RX ORDER — MORPHINE SULFATE 1 MG/ML
INJECTION, SOLUTION EPIDURAL; INTRATHECAL; INTRAVENOUS PRN
Status: DISCONTINUED | OUTPATIENT
Start: 2018-05-11 | End: 2018-05-11

## 2018-05-11 RX ORDER — BISACODYL 10 MG
10 SUPPOSITORY, RECTAL RECTAL DAILY PRN
Status: DISCONTINUED | OUTPATIENT
Start: 2018-05-13 | End: 2018-05-14 | Stop reason: HOSPADM

## 2018-05-11 RX ORDER — SODIUM CHLORIDE, SODIUM LACTATE, POTASSIUM CHLORIDE, CALCIUM CHLORIDE 600; 310; 30; 20 MG/100ML; MG/100ML; MG/100ML; MG/100ML
INJECTION, SOLUTION INTRAVENOUS CONTINUOUS
Status: DISCONTINUED | OUTPATIENT
Start: 2018-05-11 | End: 2018-05-11 | Stop reason: HOSPADM

## 2018-05-11 RX ORDER — ONDANSETRON 2 MG/ML
4 INJECTION INTRAMUSCULAR; INTRAVENOUS EVERY 6 HOURS PRN
Status: DISCONTINUED | OUTPATIENT
Start: 2018-05-11 | End: 2018-05-14 | Stop reason: HOSPADM

## 2018-05-11 RX ORDER — ONDANSETRON 2 MG/ML
4 INJECTION INTRAMUSCULAR; INTRAVENOUS EVERY 30 MIN PRN
Status: DISCONTINUED | OUTPATIENT
Start: 2018-05-11 | End: 2018-05-11 | Stop reason: HOSPADM

## 2018-05-11 RX ORDER — PROCHLORPERAZINE 25 MG
25 SUPPOSITORY, RECTAL RECTAL EVERY 12 HOURS PRN
Status: DISCONTINUED | OUTPATIENT
Start: 2018-05-11 | End: 2018-05-14 | Stop reason: HOSPADM

## 2018-05-11 RX ORDER — IBUPROFEN 400 MG/1
400 TABLET, FILM COATED ORAL EVERY 6 HOURS PRN
Status: DISCONTINUED | OUTPATIENT
Start: 2018-05-12 | End: 2018-05-12

## 2018-05-11 RX ORDER — MAGNESIUM HYDROXIDE 1200 MG/15ML
LIQUID ORAL PRN
Status: DISCONTINUED | OUTPATIENT
Start: 2018-05-11 | End: 2018-05-11

## 2018-05-11 RX ORDER — OXYCODONE HYDROCHLORIDE 5 MG/1
5-10 TABLET ORAL
Status: DISCONTINUED | OUTPATIENT
Start: 2018-05-11 | End: 2018-05-14 | Stop reason: HOSPADM

## 2018-05-11 RX ORDER — HYDROCORTISONE 2.5 %
CREAM (GRAM) TOPICAL 3 TIMES DAILY PRN
Status: DISCONTINUED | OUTPATIENT
Start: 2018-05-11 | End: 2018-05-14 | Stop reason: HOSPADM

## 2018-05-11 RX ORDER — BUPIVACAINE HYDROCHLORIDE 5 MG/ML
INJECTION, SOLUTION EPIDURAL; INTRACAUDAL PRN
Status: DISCONTINUED | OUTPATIENT
Start: 2018-05-11 | End: 2018-05-11

## 2018-05-11 RX ORDER — ACETAMINOPHEN 325 MG/1
650 TABLET ORAL EVERY 4 HOURS PRN
Status: DISCONTINUED | OUTPATIENT
Start: 2018-05-14 | End: 2018-05-14 | Stop reason: HOSPADM

## 2018-05-11 RX ORDER — CEFAZOLIN SODIUM 2 G/100ML
2 INJECTION, SOLUTION INTRAVENOUS EVERY 8 HOURS
Status: COMPLETED | OUTPATIENT
Start: 2018-05-11 | End: 2018-05-11

## 2018-05-11 RX ORDER — IBUPROFEN 600 MG/1
600 TABLET, FILM COATED ORAL EVERY 6 HOURS PRN
Status: DISCONTINUED | OUTPATIENT
Start: 2018-05-12 | End: 2018-05-12

## 2018-05-11 RX ORDER — METOCLOPRAMIDE HYDROCHLORIDE 5 MG/ML
10 INJECTION INTRAMUSCULAR; INTRAVENOUS EVERY 6 HOURS PRN
Status: DISCONTINUED | OUTPATIENT
Start: 2018-05-11 | End: 2018-05-14 | Stop reason: HOSPADM

## 2018-05-11 RX ORDER — ONDANSETRON 4 MG/1
4 TABLET, ORALLY DISINTEGRATING ORAL EVERY 30 MIN PRN
Status: DISCONTINUED | OUTPATIENT
Start: 2018-05-11 | End: 2018-05-11 | Stop reason: HOSPADM

## 2018-05-11 RX ORDER — NALOXONE HYDROCHLORIDE 0.4 MG/ML
.1-.4 INJECTION, SOLUTION INTRAMUSCULAR; INTRAVENOUS; SUBCUTANEOUS
Status: DISCONTINUED | OUTPATIENT
Start: 2018-05-11 | End: 2018-05-14 | Stop reason: HOSPADM

## 2018-05-11 RX ORDER — LIDOCAINE 40 MG/G
CREAM TOPICAL
Status: DISCONTINUED | OUTPATIENT
Start: 2018-05-11 | End: 2018-05-14 | Stop reason: HOSPADM

## 2018-05-11 RX ORDER — IBUPROFEN 800 MG/1
800 TABLET, FILM COATED ORAL EVERY 6 HOURS PRN
Status: DISCONTINUED | OUTPATIENT
Start: 2018-05-12 | End: 2018-05-12

## 2018-05-11 RX ORDER — SIMETHICONE 80 MG
80 TABLET,CHEWABLE ORAL 4 TIMES DAILY PRN
Status: DISCONTINUED | OUTPATIENT
Start: 2018-05-11 | End: 2018-05-14 | Stop reason: HOSPADM

## 2018-05-11 RX ORDER — CEFAZOLIN SODIUM 1 G/3ML
1 INJECTION, POWDER, FOR SOLUTION INTRAMUSCULAR; INTRAVENOUS SEE ADMIN INSTRUCTIONS
Status: DISCONTINUED | OUTPATIENT
Start: 2018-05-11 | End: 2018-05-11

## 2018-05-11 RX ORDER — FENTANYL CITRATE 50 UG/ML
25-50 INJECTION, SOLUTION INTRAMUSCULAR; INTRAVENOUS
Status: DISCONTINUED | OUTPATIENT
Start: 2018-05-11 | End: 2018-05-11 | Stop reason: HOSPADM

## 2018-05-11 RX ORDER — DIPHENHYDRAMINE HYDROCHLORIDE 50 MG/ML
25 INJECTION INTRAMUSCULAR; INTRAVENOUS EVERY 6 HOURS PRN
Status: DISCONTINUED | OUTPATIENT
Start: 2018-05-11 | End: 2018-05-14 | Stop reason: HOSPADM

## 2018-05-11 RX ORDER — MISOPROSTOL 200 UG/1
400 TABLET ORAL
Status: DISCONTINUED | OUTPATIENT
Start: 2018-05-11 | End: 2018-05-14 | Stop reason: HOSPADM

## 2018-05-11 RX ORDER — EPHEDRINE SULFATE 50 MG/ML
INJECTION, SOLUTION INTRAVENOUS PRN
Status: DISCONTINUED | OUTPATIENT
Start: 2018-05-11 | End: 2018-05-11

## 2018-05-11 RX ORDER — NALOXONE HYDROCHLORIDE 0.4 MG/ML
.1-.4 INJECTION, SOLUTION INTRAMUSCULAR; INTRAVENOUS; SUBCUTANEOUS
Status: ACTIVE | OUTPATIENT
Start: 2018-05-11 | End: 2018-05-12

## 2018-05-11 RX ORDER — LABETALOL HYDROCHLORIDE 5 MG/ML
10 INJECTION, SOLUTION INTRAVENOUS
Status: DISCONTINUED | OUTPATIENT
Start: 2018-05-11 | End: 2018-05-11 | Stop reason: HOSPADM

## 2018-05-11 RX ORDER — AMOXICILLIN 250 MG
1 CAPSULE ORAL 2 TIMES DAILY PRN
Status: DISCONTINUED | OUTPATIENT
Start: 2018-05-11 | End: 2018-05-14 | Stop reason: HOSPADM

## 2018-05-11 RX ORDER — DEXTROSE, SODIUM CHLORIDE, SODIUM LACTATE, POTASSIUM CHLORIDE, AND CALCIUM CHLORIDE 5; .6; .31; .03; .02 G/100ML; G/100ML; G/100ML; G/100ML; G/100ML
INJECTION, SOLUTION INTRAVENOUS CONTINUOUS
Status: DISCONTINUED | OUTPATIENT
Start: 2018-05-11 | End: 2018-05-14 | Stop reason: HOSPADM

## 2018-05-11 RX ORDER — OXYTOCIN 10 [USP'U]/ML
10 INJECTION, SOLUTION INTRAMUSCULAR; INTRAVENOUS
Status: DISCONTINUED | OUTPATIENT
Start: 2018-05-11 | End: 2018-05-14 | Stop reason: HOSPADM

## 2018-05-11 RX ORDER — LANOLIN 100 %
OINTMENT (GRAM) TOPICAL
Status: DISCONTINUED | OUTPATIENT
Start: 2018-05-11 | End: 2018-05-14 | Stop reason: HOSPADM

## 2018-05-11 RX ORDER — PRENATAL VIT/IRON FUM/FOLIC AC 27MG-0.8MG
1 TABLET ORAL DAILY
Status: DISCONTINUED | OUTPATIENT
Start: 2018-05-11 | End: 2018-05-14 | Stop reason: HOSPADM

## 2018-05-11 RX ADMIN — EPHEDRINE SULFATE 10 MG: 50 INJECTION, SOLUTION INTRAVENOUS at 07:09

## 2018-05-11 RX ADMIN — BUPIVACAINE HYDROCHLORIDE 15 MG: 5 INJECTION, SOLUTION EPIDURAL; INTRACAUDAL; PERINEURAL at 07:08

## 2018-05-11 RX ADMIN — PHENYLEPHRINE HYDROCHLORIDE 100 MCG: 10 INJECTION, SOLUTION INTRAMUSCULAR; INTRAVENOUS; SUBCUTANEOUS at 07:38

## 2018-05-11 RX ADMIN — EPHEDRINE SULFATE 5 MG: 50 INJECTION, SOLUTION INTRAVENOUS at 07:33

## 2018-05-11 RX ADMIN — ONDANSETRON 4 MG: 2 INJECTION INTRAMUSCULAR; INTRAVENOUS at 13:13

## 2018-05-11 RX ADMIN — KETOROLAC TROMETHAMINE 30 MG: 30 INJECTION, SOLUTION INTRAMUSCULAR at 15:18

## 2018-05-11 RX ADMIN — OXYTOCIN-SODIUM CHLORIDE 0.9% IV SOLN 30 UNIT/500ML 500 ML: 30-0.9/5 SOLUTION at 07:36

## 2018-05-11 RX ADMIN — PHENYLEPHRINE HYDROCHLORIDE 200 MCG: 10 INJECTION, SOLUTION INTRAMUSCULAR; INTRAVENOUS; SUBCUTANEOUS at 07:29

## 2018-05-11 RX ADMIN — PHENYLEPHRINE HYDROCHLORIDE 200 MCG: 10 INJECTION, SOLUTION INTRAMUSCULAR; INTRAVENOUS; SUBCUTANEOUS at 07:32

## 2018-05-11 RX ADMIN — PHENYLEPHRINE HYDROCHLORIDE 200 MCG: 10 INJECTION, SOLUTION INTRAMUSCULAR; INTRAVENOUS; SUBCUTANEOUS at 07:16

## 2018-05-11 RX ADMIN — EPHEDRINE SULFATE 5 MG: 50 INJECTION, SOLUTION INTRAVENOUS at 07:36

## 2018-05-11 RX ADMIN — PHENYLEPHRINE HYDROCHLORIDE 100 MCG: 10 INJECTION, SOLUTION INTRAMUSCULAR; INTRAVENOUS; SUBCUTANEOUS at 07:25

## 2018-05-11 RX ADMIN — PHENYLEPHRINE HYDROCHLORIDE 100 MCG: 10 INJECTION, SOLUTION INTRAMUSCULAR; INTRAVENOUS; SUBCUTANEOUS at 07:11

## 2018-05-11 RX ADMIN — MORPHINE SULFATE 0.3 MG: 1 INJECTION, SOLUTION EPIDURAL; INTRATHECAL; INTRAVENOUS at 07:08

## 2018-05-11 RX ADMIN — SODIUM CHLORIDE, SODIUM LACTATE, POTASSIUM CHLORIDE, CALCIUM CHLORIDE AND DEXTROSE MONOHYDRATE: 5; 600; 310; 30; 20 INJECTION, SOLUTION INTRAVENOUS at 15:19

## 2018-05-11 RX ADMIN — PHENYLEPHRINE HYDROCHLORIDE 100 MCG: 10 INJECTION, SOLUTION INTRAMUSCULAR; INTRAVENOUS; SUBCUTANEOUS at 07:09

## 2018-05-11 RX ADMIN — HYDROCODONE BITARTRATE AND ACETAMINOPHEN 500 ML: 500; 5 TABLET ORAL at 07:45

## 2018-05-11 RX ADMIN — PHENYLEPHRINE HYDROCHLORIDE 100 MCG: 10 INJECTION, SOLUTION INTRAMUSCULAR; INTRAVENOUS; SUBCUTANEOUS at 07:21

## 2018-05-11 RX ADMIN — EPHEDRINE SULFATE 10 MG: 50 INJECTION, SOLUTION INTRAVENOUS at 07:25

## 2018-05-11 RX ADMIN — CEFAZOLIN SODIUM 2 G: 2 INJECTION, SOLUTION INTRAVENOUS at 07:02

## 2018-05-11 RX ADMIN — SODIUM CITRATE AND CITRIC ACID MONOHYDRATE 30 ML: 500; 334 SOLUTION ORAL at 06:14

## 2018-05-11 RX ADMIN — EPHEDRINE SULFATE 5 MG: 50 INJECTION, SOLUTION INTRAVENOUS at 07:17

## 2018-05-11 RX ADMIN — SODIUM CHLORIDE, POTASSIUM CHLORIDE, SODIUM LACTATE AND CALCIUM CHLORIDE: 600; 310; 30; 20 INJECTION, SOLUTION INTRAVENOUS at 01:16

## 2018-05-11 RX ADMIN — CEFAZOLIN SODIUM 2 G: 2 INJECTION, SOLUTION INTRAVENOUS at 14:30

## 2018-05-11 RX ADMIN — OXYTOCIN-SODIUM CHLORIDE 0.9% IV SOLN 30 UNIT/500ML 100 ML/HR: 30-0.9/5 SOLUTION at 09:59

## 2018-05-11 RX ADMIN — KETOROLAC TROMETHAMINE 30 MG: 30 INJECTION, SOLUTION INTRAMUSCULAR at 21:41

## 2018-05-11 RX ADMIN — KETOROLAC TROMETHAMINE 30 MG: 30 INJECTION, SOLUTION INTRAMUSCULAR at 09:38

## 2018-05-11 RX ADMIN — OXYTOCIN-SODIUM CHLORIDE 0.9% IV SOLN 30 UNIT/500ML 100 ML/HR: 30-0.9/5 SOLUTION at 08:31

## 2018-05-11 RX ADMIN — PHENYLEPHRINE HYDROCHLORIDE 200 MCG: 10 INJECTION, SOLUTION INTRAMUSCULAR; INTRAVENOUS; SUBCUTANEOUS at 07:47

## 2018-05-11 RX ADMIN — CEFAZOLIN SODIUM 2 G: 2 INJECTION, SOLUTION INTRAVENOUS at 22:56

## 2018-05-11 RX ADMIN — PHENYLEPHRINE HYDROCHLORIDE 100 MCG: 10 INJECTION, SOLUTION INTRAMUSCULAR; INTRAVENOUS; SUBCUTANEOUS at 07:55

## 2018-05-11 RX ADMIN — SODIUM CHLORIDE, POTASSIUM CHLORIDE, SODIUM LACTATE AND CALCIUM CHLORIDE: 600; 310; 30; 20 INJECTION, SOLUTION INTRAVENOUS at 06:29

## 2018-05-11 RX ADMIN — SODIUM CHLORIDE 900 ML: 900 IRRIGANT IRRIGATION at 07:57

## 2018-05-11 NOTE — ANESTHESIA POSTPROCEDURE EVALUATION
Patient: Opal Hillman    Procedure(s):  Repeat  Section - Wound Class: II-Clean Contaminated    Diagnosis:Previous  Diagnosis Additional Information: Pre-operative diagnosis: Previous C/S  Post-operative diagnosis: Same  Procedure: Repeat low transverse  section        Anesthesia Type:  Spinal    Note:  Anesthesia Post Evaluation    Patient location during evaluation: OB PACU  Patient participation: Able to participate in evaluation but full recovery from regional anesthesia has not yet ocurrred but is anticipated to occur within 48 hours  Level of consciousness: awake and alert  Pain management: adequate  Airway patency: patent  Cardiovascular status: acceptable  Respiratory status: acceptable  Hydration status: acceptable  PONV: none     Anesthetic complications: None          Last vitals:  Vitals:    18 0545 18 0818 18 0822   BP: 135/78 119/54 120/58   Resp: 20  18   Temp: 98.5  F (36.9  C)  97.6  F (36.4  C)         Electronically Signed By: Kali Santiago MD  May 11, 2018  8:30 AM

## 2018-05-11 NOTE — ANESTHESIA PREPROCEDURE EVALUATION
PAC NOTE:       ANESTHESIA PRE EVALUATION:  Anesthesia Evaluation     . Pt has had prior anesthetic. Type: Regional    No history of anesthetic complications          ROS/MED HX    ENT/Pulmonary:  - neg pulmonary ROS     Neurologic:  - neg neurologic ROS     Cardiovascular:  - neg cardiovascular ROS       METS/Exercise Tolerance:     Hematologic: Comments: Lab Test        05/10/18     05/06/18     01/29/18     10/05/17                       1926          0027          1858          1451          WBC           --          9.1          10.9         11.9*         HGB          12.7         11.8         12.6         14.0          MCV           --          93           95           92            PLT           --          299          275          342            Lab Test        05/06/18                       0027          CR           0.46*                 Musculoskeletal:  - neg musculoskeletal ROS       GI/Hepatic:  - neg GI/hepatic ROS       Renal/Genitourinary:  - ROS Renal section negative       Endo:  - neg endo ROS       Psychiatric:  - neg psychiatric ROS       Infectious Disease:  - neg infectious disease ROS       Malignancy:         Other:    (+) Possibly pregnant                    Physical Exam  Normal systems: cardiovascular and pulmonary    Airway   Mallampati: II  TM distance: >3 FB  Neck ROM: full    Dental     Cardiovascular       Pulmonary              Anesthesia Plan      History & Physical Review  History and physical reviewed and following examination; no interval change.    ASA Status:  3 .    NPO Status:  > 8 hours    Plan for Spinal   PONV prophylaxis:  Ondansetron (or other 5HT-3) and Dexamethasone or Solumedrol       Postoperative Care  Postoperative pain management:  IV analgesics, Oral pain medications and Neuraxial analgesia.      Consents  Anesthetic plan, risks, benefits and alternatives discussed with:  Patient..                            .

## 2018-05-11 NOTE — PROVIDER NOTIFICATION
05/11/18 0538   Provider Notification   Provider Name/Title Dr. Conn   Method of Notification At Bedside   Request Evaluate in Person   MD at bedside to discuss POC with pt.

## 2018-05-11 NOTE — OP NOTE
Procedure Date: 2018      PREOPERATIVE DIAGNOSIS:  Intrauterine pregnancy at 41-1/7 weeks, previous  section, accepts repeat.      POSTOPERATIVE DIAGNOSIS:  Intrauterine pregnancy at 41-1/7 weeks, previous  section, accepts repeat.      PROCEDURE:  Repeat low transverse  section.      SURGEON:  Frankie Aragon MD      ANESTHESIA:  Spinal.      COMPLICATIONS:  None apparent.      ESTIMATED BLOOD LOSS:  600 mL.      INDICATIONS:  The patient is a 22-year-old  2, para 1 patient who presents to Labor and Delivery at 41-1/7 weeks with spontaneous rupture of membranes.  She has a previous history of  section for arrest of labor secondary to macrosomic fetus, and has been counseled that this baby is also macrosomic and would not recommend trial of labor.  Upon presentation she has confirmation of ruptured membranes but refuses  delivery until scheduled time, 0700 on 2018.  Risks, benefits, alternatives have been reviewed and discussed.  They include but not limited to infection, bleeding, damage to bowel, bladder or any other intraabdominal viscera as well as all risks to anesthesia and/or blood transfusion.  Additional risks include chorioamnionitis or postpartum endometritis secondary to rupture of membranes.      DESCRIPTION OF PROCEDURE:  With consent, she was taken to the operating suite.  The abdomen was prepped and draped in sterile fashion.  Flaherty catheter is in place and a spinal block is given.  Pfannenstiel skin incision is made, entry into the peritoneal cavity without incident.  The bladder flap was reflected caudad and a transverse uterine incision is made by scoring the uterus and then entry into the amniotic cavity by blunt Asha clamp and then finger fracture transversely.  Clear fluid is noted and the baby's head is then delivered through the incision.  The nasal oropharynx was suctioned with bulb suction.  Baby then is delivered without nuchal  cord,  and after a 1 minute delay, the cord was clamped, cut, and the baby was handed to the nurse in attendance.  A living male weighing 9 pounds 10 ounces, Apgars of 9 and 9 is delivered.  Placenta is delivered manually and the uterine cavity wiped free of debris.  The uterus is then closed in 2 layers of #1 chromic suture, first layer being imbricated by the second.  Retrouterine space, pericolic gutters and subvesical space were irrigated copiously.  Hemostasis had been secured.  The bladder flap was reapproximated with 2-0 Monocryl.  The parietal peritoneum was then closed with 2-0 Monocryl.  Subfascial space was irrigated copiously and when hemostasis had been secured, the fascia edge was reapproximated with 0 Vicryl in a running fashion.  Subcutaneous space was irrigated and again, when hemostasis had been secured with 2-0 plain sutures, skin edges were reapproximated with skin clips.  There were no intraoperative complications.  Sponge and needle counts were correct.  Estimated blood loss was 600 mL.    Mother and  went to normal recovery and  nursery respectively.         JAKE GARCIA MD             D: 2018   T: 2018   MT: DIANNE      Name:     JUAN MAIN   MRN:      [id number]        Account:        [id number]   :      1995           Procedure Date: 2018      Document: [id number]

## 2018-05-11 NOTE — ANESTHESIA CARE TRANSFER NOTE
Patient: Opal Hillman    Procedure(s):  Repeat  Section - Wound Class: II-Clean Contaminated    Diagnosis: Previous  Diagnosis Additional Information: No value filed.    Anesthesia Type:   Spinal     Note:    Patient transferred to:Labor and Delivery  Comments: Pt tolerated spinal to L&D recovery VSS Report to RNHandoff Report: Identifed the Patient, Identified the Reponsible Provider, Reviewed the pertinent medical history, Discussed the surgical course, Reviewed Intra-OP anesthesia mangement and issues during anesthesia, Set expectations for post-procedure period and Allowed opportunity for questions and acknowledgement of understanding      Vitals: (Last set prior to Anesthesia Care Transfer)    CRNA VITALS  2018 0744 - 2018 0819      2018             Pulse: 94    SpO2: 99 %                Electronically Signed By: LEOPOLDO Dangelo CRNA  May 11, 2018  8:19 AM

## 2018-05-11 NOTE — PLAN OF CARE
Data: Opal Hillman transferred to 442 via cart at 1020. Baby transferred via parent's arms.  Action: Receiving unit notified of transfer: Yes. Patient and family notified of room change. Belongings sent to receiving unit. Accompanied by Registered Nurse. Oriented patient to surroundings. Call light within reach.   Response: Patient tolerated transfer but nauseous, otherwise stable.

## 2018-05-11 NOTE — PROGRESS NOTES
"S:  Carina, contractions every 7 minutes, + SROM on admission with + ROM  O:/85  Temp 99.5  F (37.5  C) (Oral)  Resp 20  Ht 1.626 m (5' 4\")  Wt 116.6 kg (257 lb)  LMP 2017  BMI 44.11 kg/m2     : 2 /80 /-2    FHT's Category 1 with baseline 130s, reactive tracing, overall reassuring  EFW by leopolds 9 #  6 oz   Patient did not get her growth us as planned on 18 and did not reschedule.   She also was scheduled for repeat CS on 18, but now declines labor.   Again I reviewed with the patient risk of uterine rupture, which is increased after 40 weeks, increased with suspected macrosomia, and obesity.  Obesity with BMI also will make a  section technically more difficult and make it difficult to Do an emergent  section. I discussed this with the patient upon admission.       calculator reveals 18.3% chance for vaginal delivery.    recalculated reveals 30.6% with the updated information upon admission, including cervical dilation and effacement and station      A:  23 y/o  @ 41w1d wks EGA with hx of CS, srom, latent labor, cervix changed in effacement but not dilation and GBS negative, patient has Signed consent for . Operative note obtained and reviewed through care everywhere with documented LTCS.  Previous baby is 9 # 3 oz    P:  Continuous monitoring of fetus is recommended and discussed with RN staff and the patient.  The patient asks why fetal monitoring is necessary, to which I reiterated that due to a uterine scar there is a risk of uterine rupture and that is why continuous monitoring is recommended.  She may sit up on a ball to labor, but she has to be on the monitor.   I will not recommend to start pitocin to augment labor given the current risk of rupture with her known risk factors.     Dr. Lanie Conn, DO    OB/GYN   [facility]    "

## 2018-05-11 NOTE — PROVIDER NOTIFICATION
05/10/18 1900   Provider Notification   Provider Name/Title Dr. Conn   Method of Notification At Bedside   Notification Reason Status Update     Dr. Conn here to discuss TOLAC information sheet, patient reviewed and signed. IV started in left hand and saline locked. Patient moved to BR#5 from MAC1.

## 2018-05-11 NOTE — ANESTHESIA POSTPROCEDURE EVALUATION
Patient: Opal Hillman    Procedure(s):  Repeat  Section - Wound Class: II-Clean Contaminated    Diagnosis:Previous  Diagnosis Additional Information: Pre-operative diagnosis: Previous C/S  Post-operative diagnosis: Same  Procedure: Repeat low transverse  section        Anesthesia Type:  Spinal    Note:  Anesthesia Post Evaluation    Patient location during evaluation: PACU  Patient participation: Able to fully participate in evaluation  Level of consciousness: awake and alert  Pain management: adequate  Airway patency: patent  Cardiovascular status: acceptable  Respiratory status: acceptable  Hydration status: acceptable  PONV: none     Anesthetic complications: None          Last vitals:  Vitals:    18 0545 18 0818 18 08   BP: 135/78 119/54 120/58   Resp: 20  18   Temp: 98.5  F (36.9  C)  97.6  F (36.4  C)         Electronically Signed By: Kali Santiago MD  May 11, 2018  8:29 AM

## 2018-05-11 NOTE — ANESTHESIA PROCEDURE NOTES
Peripheral nerve/Neuraxial procedure note : intrathecal  Pre-Procedure  Performed by FLORIDA ELLISON  Referred by JOSE  Location: OB, OR      Pre-Anesthestic Checklist: patient identified, IV checked, risks and benefits discussed, informed consent, monitors and equipment checked and pre-op evaluation    Timeout  Correct Patient: Yes   Correct Procedure: Yes   Correct Site: Yes   Correct Laterality: N/A   Correct Position: Yes   Site Marked: N/A   .   Procedure Documentation    .    Procedure:    Intrathecal.  Insertion Site:L3-4  (midline approach)      Patient Prep;mask, sterile gloves, povidone-iodine 7.5% surgical scrub, patient draped.  .  Needle: Sprotte Spinal Needle (gauge): 25  Spinal/LP Needle Length (inches): 3.5 # of attempts: 1 and  # of redirects:  1 Introducer used .       Assessment/Narrative  Paresthesias: No.  .  .  blood tinged CSF fluid removed .

## 2018-05-11 NOTE — PLAN OF CARE
Problem: Patient Care Overview  Goal: Plan of Care/Patient Progress Review  Outcome: Improving  VSS. Fundus firm, lochia small. Last hourly check at 1500m, then will be on every 4 hours. Pain managed with toradol at this time. C/o nausea and vomiting, but denied zofran or reglan at this time. Waiting on tylenol administration until nausea subsides. Did tolerated some bites of saltine crackers. Breastfeeding going fairly well, infant is sleepy at breast. Hand expression post feeds to move milk and ensure blood glucose stability for infant. All questions and concerns answered at this time.

## 2018-05-11 NOTE — PROGRESS NOTES
"S:  Discussed options with patient.     O:/75  Temp 98.6  F (37  C) (Oral)  Resp 20  Ht 1.626 m (5' 4\")  Wt 116.6 kg (257 lb)  LMP 2017  BMI 44.11 kg/m2     FHT's Category 1 with baseline 130s reactive reassuring   EFW 9-10 pounds    A:  21 y/o  @ 41w2d wks EGA with SROM, latent labor and GBS negative   After considering her options, she agrees to  section.   I offered her immediate  section.  She desires to proceed with  section with her primary ob/gyn as planned at 0700.  I will be immediately available until that time in 1 hour.   Patient is signing consent.   P:  Proceed with planned  section.  I have a call out to her doctor to discuss the sign out.     Dr. Lanie Conn, DO    OB/GYN   [facility]    "

## 2018-05-11 NOTE — OP NOTE
[facility]  Obstetrics Brief Operative Note    Pre-operative diagnosis: Previous C/S   Post-operative diagnosis: Same   Procedure: Repeat low transverse  section   Surgeon: Frankie Aragon MD   Assistant(s): None   Anesthesia: Spinal anesthesia   Estimated blood loss: 600 ml   Total IV fluids: (See anesthesia record)   Blood transfusion: No transfusion was given during surgery   Total urine output: (See anesthesia record)   Drains: None   Specimens: none   Findings: Live   Male   Complications: None   Condition: Infant stable, transferred to general care nursery  Mother stable, transfered to floor   Comments: See dictated operative report for full details

## 2018-05-11 NOTE — PROGRESS NOTES
"S:  Craina irregularly.  Writer has been in house to monitor labor throughout the night.    No cervical exams have been done throughout the night to reduce the risk of infection.   Fetal status has been reassuring throughout the night.    Patient's contractions occurring, but not regularly  O:/75  Temp 98.6  F (37  C) (Oral)  Resp 20  Ht 1.626 m (5' 4\")  Wt 116.6 kg (257 lb)  LMP 2017  BMI 44.11 kg/m2   : /-2    SROM 0300 5/10/18  FHT's Category 1 with baseline 130s    A:  21 y/o  @ 41w2d wks EGA with SROM and irregular contractions and GBS negative.    With no change in cervix discussed recommendation again for proceeding with  section.  Discussed risk of infection and chorioamnionitis if she continues to declines  section.      P:  Patient asks for 5 minutes to discuss options with her husbnad.      Dr. Lanie Conn, DO    OB/GYN   [facility]    "

## 2018-05-11 NOTE — PROGRESS NOTES
Operative note viewed from previous discharge summary, confirmed LTCS  Awaiting full operative note     Patient desires -SATISH, and signed [place] consent   Again recommended to proceed with  section delivery.   I don't recommend pitocin augmentation and the patient agrees and declines pitocin       Dr. Lanie Conn, DO    Obstetrics and Gynecology  [facility]

## 2018-05-12 LAB — HGB BLD-MCNC: 9.8 G/DL (ref 11.7–15.7)

## 2018-05-12 PROCEDURE — 25000132 ZZH RX MED GY IP 250 OP 250 PS 637: Performed by: OBSTETRICS & GYNECOLOGY

## 2018-05-12 PROCEDURE — 12000031 ZZH R&B OB CRITICAL

## 2018-05-12 PROCEDURE — 36415 COLL VENOUS BLD VENIPUNCTURE: CPT | Performed by: OBSTETRICS & GYNECOLOGY

## 2018-05-12 PROCEDURE — 40000083 ZZH STATISTIC IP LACTATION SERVICES 1-15 MIN

## 2018-05-12 PROCEDURE — 85018 HEMOGLOBIN: CPT | Performed by: OBSTETRICS & GYNECOLOGY

## 2018-05-12 RX ORDER — IBUPROFEN 600 MG/1
600 TABLET, FILM COATED ORAL EVERY 6 HOURS PRN
Status: DISCONTINUED | OUTPATIENT
Start: 2018-05-12 | End: 2018-05-14 | Stop reason: HOSPADM

## 2018-05-12 RX ORDER — IBUPROFEN 800 MG/1
800 TABLET, FILM COATED ORAL EVERY 6 HOURS PRN
Status: DISCONTINUED | OUTPATIENT
Start: 2018-05-12 | End: 2018-05-14 | Stop reason: HOSPADM

## 2018-05-12 RX ORDER — IBUPROFEN 400 MG/1
400 TABLET, FILM COATED ORAL EVERY 6 HOURS PRN
Status: DISCONTINUED | OUTPATIENT
Start: 2018-05-12 | End: 2018-05-14 | Stop reason: HOSPADM

## 2018-05-12 RX ADMIN — IBUPROFEN 800 MG: 800 TABLET ORAL at 18:22

## 2018-05-12 RX ADMIN — SENNOSIDES AND DOCUSATE SODIUM 1 TABLET: 8.6; 5 TABLET ORAL at 18:22

## 2018-05-12 RX ADMIN — IBUPROFEN 600 MG: 600 TABLET ORAL at 04:33

## 2018-05-12 RX ADMIN — ACETAMINOPHEN 975 MG: 325 TABLET ORAL at 10:16

## 2018-05-12 RX ADMIN — PRENATAL VIT W/ FE FUMARATE-FA TAB 27-0.8 MG 1 TABLET: 27-0.8 TAB at 17:18

## 2018-05-12 RX ADMIN — IBUPROFEN 800 MG: 800 TABLET ORAL at 12:15

## 2018-05-12 RX ADMIN — SENNOSIDES AND DOCUSATE SODIUM 2 TABLET: 8.6; 5 TABLET ORAL at 10:16

## 2018-05-12 RX ADMIN — ACETAMINOPHEN 975 MG: 325 TABLET ORAL at 01:20

## 2018-05-12 RX ADMIN — ACETAMINOPHEN 975 MG: 325 TABLET ORAL at 17:19

## 2018-05-12 NOTE — PLAN OF CARE
Problem: Patient Care Overview  Goal: Plan of Care/Patient Progress Review  Outcome: Improving  Patient stable throughout shift.  VS WNL.  Pt breastfeeding fairly well and also supplementing with 15 mL donor milk to help with baby's blood sugars.  Nipples are starting to get sore, so Mothers love nipple cream was given.  Catheter still in as patient was extremely tired and did not want to get up yet.  UO adequate but is a darker herve color.  Encouraged pt to continue to drink plenty of water.  IV started to come out, so the last dose of toradol was discontinued and oral Ibuprofen was started.  Nauseasness has improved and pt was able to tolerate foods.  Bonding well with baby.  FOB present in room all night, supportive and helpful with cares.

## 2018-05-12 NOTE — PLAN OF CARE
Problem: Patient Care Overview  Goal: Plan of Care/Patient Progress Review  Outcome: Improving  VS within normal limits. Pt reported nausea getting better. At this time able to keep down water with lemon and ice chips. Scheduled Tylenol and Prenatal vitamins not given because of nausea. Pain well controlled with Toradol. Flaherty catheter patent, catheter care done. Pt encouraged to drink more. Needs some help to get baby on breast, also requested supplementing baby with donor milk. Positive attachment behavior observed with infant.  present and very supportive. Continue to monitor.

## 2018-05-12 NOTE — PROGRESS NOTES
Postpartum Progress Note  Opal Hillman  [id number]    Subjective:   Patiet doing well. Pain well controlled with POs. Denies nausea and vomiting. Ambulating without difficulty. Adequate PO intake. + flatus, has not yet voided.    # Pain Assessment:  Current Pain Score 2018   Patient currently in pain? -   Pain score (0-10) 3   Pain location -   Pain descriptors -   - Opal is experiencing pain due to  section. Pain management was discussed and the plan was created in a collaborative fashion.  Opal's response to the current recommendations: engaged  - Please see the plan for pain management as documented below     Objective:  Vitals:    18 1915 18 2300 18 0316 18 0934   BP: 112/72 110/74 106/59 110/63   Pulse: 72 79 64 72   Resp: 16 18  16   Temp: 98.3  F (36.8  C) 98.7  F (37.1  C) 98.2  F (36.8  C) 98.6  F (37  C)   TempSrc: Oral Oral     SpO2: 100%  96% 98%   Weight:       Height:          General: resting comfortably, in NAD  Heart: regular rate, well perfused  Lungs: no increased work of breathing, no cough  Abdomen: soft, non distended, appropriately tender, FF at U -1  Incision:  Dressing clean and dry  Extremities: scant b/l edema, non-tender to palpation    I/O last 3 completed shifts:  In: 1376 [I.V.:1376]  Out: 1050 [Urine:1050]    Labs:  Hemoglobin   Date Value Ref Range Status   2018 9.8 (L) 11.7 - 15.7 g/dL Final   05/10/2018 12.7 11.7 - 15.7 g/dL Final       Assessment/Plan:  22 year old  who is POD#1 s/p repeat c/s.  Currently stable and doing well  - Routine post-partum cares  - Heme: Post-op Hgb 9.8, no s/sx ABLA  - Pain: continue tylenol, ibuprofen, ice packs, hot packs as needed.    - Opioids available as needed if the above treatments are inadequate.  - Baby: in room doing well  - Dispo: d/c to home POD#3    Shelli Marquez MD  OB/GYN

## 2018-05-12 NOTE — PLAN OF CARE
Problem: Patient Care Overview  Goal: Plan of Care/Patient Progress Review  Outcome: Improving  Afebrile.  VSS.  Tolerating regular diet.  Passing flatus.  Urinary catheter removed at 1200.  No void yet.  Ambulated in room, tolerated well.  Breastfeeding independently.  Support provided to rouse baby to wakeful state.  Encouraged to pump.  Pain well managed with acetaminophen and ibuprofen.  Spouse at bedside and supportive.

## 2018-05-13 PROCEDURE — 12000027 ZZH R&B OB

## 2018-05-13 PROCEDURE — 25000132 ZZH RX MED GY IP 250 OP 250 PS 637: Performed by: OBSTETRICS & GYNECOLOGY

## 2018-05-13 RX ADMIN — IBUPROFEN 800 MG: 800 TABLET ORAL at 12:33

## 2018-05-13 RX ADMIN — ACETAMINOPHEN 975 MG: 325 TABLET ORAL at 09:02

## 2018-05-13 RX ADMIN — SENNOSIDES AND DOCUSATE SODIUM 1 TABLET: 8.6; 5 TABLET ORAL at 18:49

## 2018-05-13 RX ADMIN — IBUPROFEN 800 MG: 800 TABLET ORAL at 05:55

## 2018-05-13 RX ADMIN — IBUPROFEN 800 MG: 800 TABLET ORAL at 00:39

## 2018-05-13 RX ADMIN — SENNOSIDES AND DOCUSATE SODIUM 1 TABLET: 8.6; 5 TABLET ORAL at 09:02

## 2018-05-13 RX ADMIN — PRENATAL VIT W/ FE FUMARATE-FA TAB 27-0.8 MG 1 TABLET: 27-0.8 TAB at 17:01

## 2018-05-13 RX ADMIN — IBUPROFEN 800 MG: 800 TABLET ORAL at 18:49

## 2018-05-13 RX ADMIN — ACETAMINOPHEN 975 MG: 325 TABLET ORAL at 17:01

## 2018-05-13 RX ADMIN — ACETAMINOPHEN 975 MG: 325 TABLET ORAL at 00:40

## 2018-05-13 NOTE — PLAN OF CARE
Problem: Patient Care Overview  Goal: Plan of Care/Patient Progress Review  Outcome: No Change  VSS, incisional pain is controlled with pharmacological interventions, pt is breastfeeding, having her milk in; encouraged pt to work on filling out the birth certificate and PPD, informed about discharge expectations for tomorrow;  and older child are in room, appropriate bonding is observed with baby.

## 2018-05-13 NOTE — PROGRESS NOTES
[facility]    Obstetrics Post-Op / Progress Note    Assessment & Plan   Assessment:  -2 Days Post-Op  Procedure(s):  Repeat  Section - Wound Class: II-Clean Contaminated    Doing well.  Clean wound without signs of infection.  No immediate surgical complications identified.  No excessive bleeding  Pain well-controlled.    Plan:  Ambulation encouraged  Breast feeding strategies discussed  Reportable signs and symptoms dicussed with the patient  Anticipate discharge in 1 days    Ana Jones     Interval History   Doing well.  Pain is well-controlled.  No fevers.  No history of wound drainage, warmth or significant erythema.  Good appetite.  Denies chest pain, shortness of breath, nausea or vomiting.  Ambulatory.  Breastfeeding well.    Medications     dextrose 5% lactated ringers 125 mL/hr at 18 1519     NO Rho (D) immune globulin (RhoGam) needed - mother Rh POSITIVE       oxytocin in 0.9% NaCl 100 mL/hr (18 0959)     oxytocin in 0.9% NaCl         acetaminophen  975 mg Oral Q8H     measles, mumps and rubella vaccine  0.5 mL Subcutaneous Once     prenatal multivitamin plus iron  1 tablet Oral Daily     Tdap (tetanus-diphtheria-acell pertussis)  0.5 mL Intramuscular Once       Physical Exam   Temp: 98.2  F (36.8  C) Temp src: Oral BP: 128/79 Pulse: 88   Resp: 18        Vitals:    05/10/18 1714   Weight: 116.6 kg (257 lb)     Vital Signs with Ranges  Temp:  [97.8  F (36.6  C)-98.7  F (37.1  C)] 98.2  F (36.8  C)  Pulse:  [77-88] 88  Resp:  [16-18] 18  BP: (125-128)/(68-79) 128/79  I/O last 3 completed shifts:  In: 1100 [P.O.:1100]  Out: 1000 [Urine:1000]    Uterine fundus is firm, non-tender and at the level of the umbilicus  Incision C/D/I  Extremities Non-tender    Data   Recent Labs   Lab Test  05/10/18   1926   ABO  A   RH  Pos   AS  Neg     Recent Labs   Lab Test  18   0923  05/10/18   1926   HGB  9.8*  12.7     Recent Labs   Lab Test  10/05/17   1451   RUQIGG  162

## 2018-05-13 NOTE — PLAN OF CARE
Problem: Patient Care Overview  Goal: Plan of Care/Patient Progress Review  Outcome: Improving  Pt is stable. AVSS. Ambulating. Voiding, passing flatus, BM +. Tolerating diet. Denies N/V. Incision is open to air, CDI. Pain is controlled with Ibuprofen and tylenol. Breastfeeding. Bonding well with baby. Continue to monitor per care plan.

## 2018-05-13 NOTE — PLAN OF CARE
Problem: Patient Care Overview  Goal: Plan of Care/Patient Progress Review  Outcome: Improving  VSS. Tolerating regular diet well. Pt is voiding without difficulty and ambulating independently. Pain is well controlled with Ibuprofen and Tylenol. Independent in self care and is able to care for infant. Breastfeeding independently.  present at bedside and supportive. Continue to monitor.

## 2018-05-13 NOTE — LACTATION NOTE
Lactation in to see patient. Patient states she is having trouble getting baby to breast feed. Has been supplementing with doner breast milk. Assistance offered, patient decline help. Writer let patient know that I was here till 2330 and if she changed her mind and had questions or concerns to call for assistance.

## 2018-05-14 VITALS
SYSTOLIC BLOOD PRESSURE: 133 MMHG | HEIGHT: 64 IN | BODY MASS INDEX: 43.87 KG/M2 | TEMPERATURE: 98 F | DIASTOLIC BLOOD PRESSURE: 89 MMHG | HEART RATE: 78 BPM | WEIGHT: 257 LBS | OXYGEN SATURATION: 98 % | RESPIRATION RATE: 16 BRPM

## 2018-05-14 PROCEDURE — 25000132 ZZH RX MED GY IP 250 OP 250 PS 637: Performed by: OBSTETRICS & GYNECOLOGY

## 2018-05-14 PROCEDURE — 40000083 ZZH STATISTIC IP LACTATION SERVICES 1-15 MIN

## 2018-05-14 RX ORDER — IBUPROFEN 600 MG/1
600 TABLET, FILM COATED ORAL EVERY 6 HOURS PRN
Qty: 120 TABLET | Refills: 0 | Status: SHIPPED | OUTPATIENT
Start: 2018-05-14 | End: 2019-07-22

## 2018-05-14 RX ORDER — AMOXICILLIN 250 MG
1 CAPSULE ORAL 2 TIMES DAILY PRN
Qty: 100 TABLET | Refills: 0 | Status: SHIPPED | OUTPATIENT
Start: 2018-05-14 | End: 2019-07-22

## 2018-05-14 RX ORDER — FERROUS GLUCONATE 324(38)MG
324 TABLET ORAL
Qty: 100 TABLET | Refills: 0 | Status: SHIPPED | OUTPATIENT
Start: 2018-05-14 | End: 2019-07-22

## 2018-05-14 RX ORDER — OXYCODONE HYDROCHLORIDE 5 MG/1
5-10 TABLET ORAL
Qty: 30 TABLET | Refills: 0 | Status: SHIPPED | OUTPATIENT
Start: 2018-05-14 | End: 2019-07-22

## 2018-05-14 RX ADMIN — ACETAMINOPHEN 975 MG: 325 TABLET ORAL at 01:28

## 2018-05-14 RX ADMIN — IBUPROFEN 800 MG: 800 TABLET ORAL at 01:29

## 2018-05-14 RX ADMIN — ACETAMINOPHEN 650 MG: 325 TABLET ORAL at 09:32

## 2018-05-14 RX ADMIN — IBUPROFEN 800 MG: 800 TABLET ORAL at 08:30

## 2018-05-14 NOTE — DISCHARGE SUMMARY
[facility] Discharge Summary    Opal Hillman MRN# [id number]   Age: 22 year old YOB: 1995     Date of Admission:  5/10/2018  Date of Discharge::  2018  Admitting Physician:  Frankie Aragon MD  Discharge Physician:  Frankie Aragon MD     Home clinic: [facility]          Admission Diagnoses:   Previous  Indication for care in labor or delivery  Indication for care in labor or delivery  S/P           Discharge Diagnosis:   Previous          Procedures:   Procedure(s): Repeat low transverse  section       No other procedures performed during this admission           Medications Prior to Admission:     Prescriptions Prior to Admission   Medication Sig Dispense Refill Last Dose     cholecalciferol (VITAMIN D3) 1000 UNIT tablet Take by mouth daily 30 tablet  Past Month at Unknown time     Prenatal Vit-Fe Fumarate-FA (PRENATAL MULTIVITAMIN PLUS IRON) 27-0.8 MG TABS per tablet Take 1 tablet by mouth daily 100 tablet 3 Past Week at Unknown time             Discharge Medications:     Current Discharge Medication List      START taking these medications    Details   ferrous gluconate (FERGON) 324 (38 Fe) MG tablet Take 1 tablet (324 mg) by mouth daily (with breakfast)  Qty: 100 tablet, Refills: 0    Associated Diagnoses: S/P       ibuprofen (ADVIL/MOTRIN) 600 MG tablet Take 1 tablet (600 mg) by mouth every 6 hours as needed for other (carmping)  Qty: 120 tablet, Refills: 0    Associated Diagnoses: S/P       oxyCODONE IR (ROXICODONE) 5 MG tablet Take 1-2 tablets (5-10 mg) by mouth every 3 hours as needed for other (pain control or improvement in physical function. Hold dose for analgesic side effects.)  Qty: 30 tablet, Refills: 0    Associated Diagnoses: S/P       senna-docusate (SENOKOT-S;PERICOLACE) 8.6-50 MG per tablet Take 1 tablet by mouth 2 times daily as needed for constipation  Qty: 100 tablet, Refills: 0    Associated  Diagnoses: S/P          CONTINUE these medications which have NOT CHANGED    Details   cholecalciferol (VITAMIN D3) 1000 UNIT tablet Take by mouth daily  Qty: 30 tablet      Prenatal Vit-Fe Fumarate-FA (PRENATAL MULTIVITAMIN PLUS IRON) 27-0.8 MG TABS per tablet Take 1 tablet by mouth daily  Qty: 100 tablet, Refills: 3                   Consultations:   No consultations were requested during this admission          Brief History of Labor or Admission:   See note           Hospital Course:   The patient's hospital course was unremarkable.  She recovered as anticipated and experienced no post-operative complications.  On discharge, her pain was well controlled. Vaginal bleeding is similar to peak menstrual flow.  Voiding without difficulty.  Ambulating well and tolerating a normal diet.  No fever or significant wound drainage.  Breastfeeding well.  Infant is stable.  No bowel movement yet.  She was discharged on post-partum day #3.    Post-partum hemoglobin:   Hemoglobin   Date Value Ref Range Status   2018 9.8 (L) 11.7 - 15.7 g/dL Final             Discharge Instructions and Follow-Up:   Discharge diet: Regular   Discharge activity: Lifting restricted to baby and car seat for 6 weeks  No driving or operating machinery while on narcotic analgesics  Pelvic rest: abstain from intercourse and do not use tampons for 6 week(s)   Discharge follow-up: Follow up with primary care provider in 1-2 weeks   Wound care: Drink plenty of fluids           Discharge Disposition:   Discharged to home      Attestation:  I have reviewed today's vital signs, notes, medications, labs and imaging.    Frankie Aragon MD

## 2018-05-14 NOTE — PLAN OF CARE
Problem: Postpartum ( Delivery) (Adult,Obstetrics,Pediatric)  Goal: Signs and Symptoms of Listed Potential Problems Will be Absent, Minimized or Managed (Postpartum)  Signs and symptoms of listed potential problems will be absent, minimized or managed by discharge/transition of care (reference Postpartum ( Delivery) (Adult,Obstetrics,Pediatric) CPG).   Outcome: Improving  Pt VSS. Afebrile. Minimal bleeding. Incision with staples, c/d/i.  Staples removed and steristrips applied.  Pt given instructions on how to care for incision at home. Pt eating and drinking well. No nausea.  Taking tylenol and motrin for pain. Breastfeeding infant, going well, mom's milk is in.  Attentive to infants needs, bonding well with infant.   Discharge instructions given. Home meds explained. Scripts for medication and breast pump provided. Questions answered. D/C home with baby, walked out by RN.

## 2018-05-14 NOTE — DISCHARGE INSTRUCTIONS
Postop  Birth Instructions  Please make an appointment to follow up with your primary OB in clinic in 1-2 weeks.    [facility] Lactation Consultant:  [phone]    Activity       Do not lift more than 10 pounds for 6 weeks after surgery.  Ask family and friends for help when you need it.    No driving until you have stopped taking your pain medications (usually two weeks after surgery).    No heavy exercise or activity for 6 weeks.  Don't do anything that will put a strain on your surgery site.    Don't strain when using the toilet.  Your care team may prescribe a stool softener if you have problems with your bowel movements.     To care for your incision:       Keep the incision clean and dry.    Do not soak your incision in water. No swimming or hot tubs until it has fully healed. You may soak in the bathtub if the water level is below your incision.    Do not use peroxide, gel, cream, lotion, or ointment on your incision.    Adjust your clothes to avoid pressure on your surgery site (check the elastic in your underwear for example).     You may see a small amount of clear or pink drainage and this is normal.  Check with your health care provider:       If the drainage increases or has an odor.    If the incision reddens, you have swelling, or develop a rash.    If you have increased pain and the medicine we prescribed doesn't help.    If you have a fever above 100.4 F (38 C) with or without chills when placing thermometer under your tongue.   The area around your incision (surgery wound), will feel numb.  This is normal. The numbness should go away in less than a year.     Keep your hands clean:  Always wash your hands before touching your incision (surgery wound). This helps reduce your risk of infection. If your hands aren't dirty, you may use an alcohol hand-rub to clean your hands. Keep your nails clean and short.    Call your healthcare provider if you have any of these symptoms:       You  soak a sanitary pad with blood within 1 hour, or you see blood clots larger than a golf ball.    Bleeding that lasts more than 6 weeks.    Vaginal discharge that smells bad.    Severe pain, cramping or tenderness in your lower belly area.    A need to urinate more frequently (use the toilet more often), more urgently (use the toilet very quickly), or it burns when you urinate.    Nausea and vomiting.    Redness, swelling or pain around a vein in your leg.    Problems breastfeeding or a red or painful area on your breast.    Chest pain and cough or are gasping for air.    Problems with coping with sadness, anxiety or depression. If you have concerns about hurting yourself or the baby, call your provider immediately.      You have questions or concerns after you return home.

## 2018-05-14 NOTE — LACTATION NOTE
LC to see patient.  The latch has improved and baby is nursing well now.  Milk is transitioning and large volumes noted when pumping.  NAOMI reviewed best practice for pacifier, discussed symptoms of mastitis and when to call her doctor.  No other questions.  She is aware she may call prn.

## 2018-05-14 NOTE — PLAN OF CARE
Problem: Patient Care Overview  Goal: Plan of Care/Patient Progress Review  Outcome: Improving  Pt is AVSS. Ambulating. Voiding. BM +. Pain is controlled with Ibuprofen and Tylenol. Incision is CDI, open to air. Breastfeeding. Bonding well with baby. DC is planned for tomorrow. BC and Depression papers are complete.

## 2018-05-14 NOTE — PLAN OF CARE
Problem: Patient Care Overview  Goal: Plan of Care/Patient Progress Review  Outcome: Improving  VSS. Pain well controlled with Tylenol and Ibuprofen. Pt decided to stay until tomorrow. Pt is voiding without difficulty. No apparent s/s of infection with incision. Breastfeeding well. Bonding well with baby.

## 2018-05-30 PROBLEM — O34.219 PREVIOUS CESAREAN DELIVERY, ANTEPARTUM CONDITION OR COMPLICATION: Status: RESOLVED | Noted: 2017-10-18 | Resolved: 2018-05-30

## 2018-05-30 PROBLEM — O09.90 HIGH-RISK PREGNANCY: Status: RESOLVED | Noted: 2017-10-18 | Resolved: 2018-05-30

## 2018-05-30 PROBLEM — O09.299 HISTORY OF PRE-ECLAMPSIA IN PRIOR PREGNANCY, CURRENTLY PREGNANT: Status: RESOLVED | Noted: 2017-10-18 | Resolved: 2018-05-30

## 2018-05-30 PROBLEM — O09.293 PRIOR FETAL MACROSOMIA IN THIRD TRIMESTER, ANTEPARTUM: Status: RESOLVED | Noted: 2017-10-18 | Resolved: 2018-05-30

## 2019-07-22 ENCOUNTER — PRENATAL OFFICE VISIT (OUTPATIENT)
Dept: NURSING | Facility: CLINIC | Age: 24
End: 2019-07-22
Payer: COMMERCIAL

## 2019-07-22 VITALS
BODY MASS INDEX: 39.99 KG/M2 | HEIGHT: 65 IN | HEART RATE: 64 BPM | SYSTOLIC BLOOD PRESSURE: 116 MMHG | DIASTOLIC BLOOD PRESSURE: 66 MMHG | WEIGHT: 240 LBS

## 2019-07-22 DIAGNOSIS — Z34.81 ENCOUNTER FOR SUPERVISION OF OTHER NORMAL PREGNANCY IN FIRST TRIMESTER: Primary | ICD-10-CM

## 2019-07-22 LAB
ABO + RH BLD: NORMAL
ABO + RH BLD: NORMAL
BLD GP AB SCN SERPL QL: NORMAL
BLOOD BANK CMNT PATIENT-IMP: NORMAL
ERYTHROCYTE [DISTWIDTH] IN BLOOD BY AUTOMATED COUNT: 13.6 % (ref 10–15)
HCG, QUAL URINE: POSITIVE
HCT VFR BLD AUTO: 42.4 % (ref 35–47)
HGB BLD-MCNC: 13.8 G/DL (ref 11.7–15.7)
MCH RBC QN AUTO: 30.2 PG (ref 26.5–33)
MCHC RBC AUTO-ENTMCNC: 32.5 G/DL (ref 31.5–36.5)
MCV RBC AUTO: 93 FL (ref 78–100)
PLATELET # BLD AUTO: 299 10E9/L (ref 150–450)
RBC # BLD AUTO: 4.57 10E12/L (ref 3.8–5.2)
SPECIMEN EXP DATE BLD: NORMAL
WBC # BLD AUTO: 8.9 10E9/L (ref 4–11)

## 2019-07-22 PROCEDURE — 86901 BLOOD TYPING SEROLOGIC RH(D): CPT | Performed by: OBSTETRICS & GYNECOLOGY

## 2019-07-22 PROCEDURE — 87340 HEPATITIS B SURFACE AG IA: CPT | Performed by: OBSTETRICS & GYNECOLOGY

## 2019-07-22 PROCEDURE — 86900 BLOOD TYPING SEROLOGIC ABO: CPT | Performed by: OBSTETRICS & GYNECOLOGY

## 2019-07-22 PROCEDURE — 85027 COMPLETE CBC AUTOMATED: CPT | Performed by: OBSTETRICS & GYNECOLOGY

## 2019-07-22 PROCEDURE — 86762 RUBELLA ANTIBODY: CPT | Performed by: OBSTETRICS & GYNECOLOGY

## 2019-07-22 PROCEDURE — 87086 URINE CULTURE/COLONY COUNT: CPT | Performed by: OBSTETRICS & GYNECOLOGY

## 2019-07-22 PROCEDURE — 86850 RBC ANTIBODY SCREEN: CPT | Performed by: OBSTETRICS & GYNECOLOGY

## 2019-07-22 PROCEDURE — 36415 COLL VENOUS BLD VENIPUNCTURE: CPT | Performed by: OBSTETRICS & GYNECOLOGY

## 2019-07-22 PROCEDURE — 84703 CHORIONIC GONADOTROPIN ASSAY: CPT

## 2019-07-22 PROCEDURE — 87491 CHLMYD TRACH DNA AMP PROBE: CPT | Performed by: OBSTETRICS & GYNECOLOGY

## 2019-07-22 PROCEDURE — 86780 TREPONEMA PALLIDUM: CPT | Performed by: OBSTETRICS & GYNECOLOGY

## 2019-07-22 PROCEDURE — 99207 ZZC NO CHARGE NURSE ONLY: CPT

## 2019-07-22 PROCEDURE — 87389 HIV-1 AG W/HIV-1&-2 AB AG IA: CPT | Performed by: OBSTETRICS & GYNECOLOGY

## 2019-07-22 PROCEDURE — 87591 N.GONORRHOEAE DNA AMP PROB: CPT | Performed by: OBSTETRICS & GYNECOLOGY

## 2019-07-22 ASSESSMENT — MIFFLIN-ST. JEOR: SCORE: 1834.51

## 2019-07-22 NOTE — PROGRESS NOTES
Prenatal book and folder (containing standard educational hand-outs and brochures) given to patient. Information in folder reviewed. Questions answered. Brochure given on optional screening available to assess chromosomal anomalies. Pt advised to call the clinic if she has any questions or concerns related to her pregnancy. Prenatal labs obtained. New prenatal visit scheduled on 8/6 with Dr Jorgensen.    9w0d    Lab Results   Component Value Date    PAP NIL 10/18/2017           Patient supplied answers from flow sheet for:  Prenatal OB Questionnaire.  Past Medical History  Diabetes?: No  Hypertension : (!) Yes(in first pregnancy)  Heart disease, mitral valve prolapse or rheumatic fever?: No  An autoimmune disease such as lupus or rheumatoid arthritis?: No  Kidney disease or urinary tract infection?: No  Epilepsy, seizures or spells?: No  Migraine headaches?: No  A stroke or loss of function or sensation?: No  Any other neurological problems?: No  Have you ever been treated for depression?: No  Are you having problems with crying spells or loss of self-esteem?: No  Have you ever required psychiatric care?: No  Have you ever had hepatitis, liver disease or jaundice?: No  Have you been treated for blood clots in your veins, deep vein thromosis, inflammation in the veins, thrombosis, phlebitis, pulmonary embolism or varicosities?: No  Have you had excessive bleeding after surgery or dental work?: No  Do you bleed more than other women after a cut or scratch?: No  Do you have a history of anemia?: No  Have you ever had thyroid problems or taken thyroid medication?: No   Do you have any endocrine problems?: No  Have you ever been in a major accident or suffered serious trauma?: No  Within the last year, has anyone hit, slapped, kicked or otherwise hurt you?: No  In the last year, has anyone forced you to have sex when you didn't want to?: No    Past Medical History 2   Have you ever received a blood transfusion?: No  Would  you refuse a blood transfusion if a doctor judged it to be medically necessary?: No  Does anyone in your home smoke?: No  Do you use tobacco products?: No  Do you drink beer, wine or hard liquor?: No  Do you use any of the following: marijuana, speed, cocaine, heroin, hallucinogens or other drugs?: No   Is your blood type Rh negative?: No  Have you ever had abnormal antibodies in your blood?: No  Have you ever had asthma?: No  Have you ever had tuberculosis?: No  Do you have any allergies to drugs or over-the-counter medications?: No  Allergies: Dust Mites, Aspartame, Ethanol, Venlafaxine, Hydrochloride, Sertraline: No  Have you had any breast problems?: No  Have you ever ?: (!) Yes  Have you had any gynecological surgical procedures such as cervical conization, a LEEP procedure, laser treatment, cryosurgery of the cervix or a dilation and curettage, etc?: (!) Yes()  Have you ever had any other surgical procedures?: No  Have you been hospitalized for a nonsurgical reason excluding normal delivery?: No  Have you ever had any anesthetic complications?: No  Have you ever had an abnormal pap smear?: Unknown    Past Medical History (Continued)  Do you have a history of abnormalities of the uterus?: No  Did your mother take AMY or any other hormones when she was pregnant with you?: No  Did it take you more than a year to become pregnant?: No  Have you ever been evaluated or treated for infertility?: No  Is there a history of medical problems in your family, which you feel may be important to this pregnancy?: No  Do you have any other problems we have not asked about which you feel may be important to this pregnancy?: No    Symptoms since last menstrual period  Do you have any of the following symptoms: abdominal pain, blood in stools or urine, chest pain, shortness of breath, coughing or vomiting up blood, your heart racing or skipping beats, nausea and vomiting, pain on urination or vaginal discharge or  bleed: No  Current medications, including over-the-counter medications, you are using? (If not applicable answer none): PNV, vitamin D  Will the patient be 35 years old or older at the time of delivery?: No    Has the patient, baby's father or anyone in either family had:  Thalassemia (Italian, Greek, Mediterranean or  background only) and an MCV result less than 80?: No  Neural tube defect such as meningomyelocele, spina bifida or anencephaly?: No  Congenital heart defect?: No  Down's Syndrome?: No  Philip-Sachs disease (Protestant, Cajun, Vietnamese-[place])?: No  Sickle cell disease or trait ()?: No  Hemophilia or other inherited problems of blood?: No  Muscular dystrophy?: No  Cystic fibrosis?: No  [place]'s chorea?: No  Mental retardation/autism?: No  Any other inherited genetic or chromosomal disorder?: No  Maternal metabolic disorder (e.g Insulin-dependent diabetes, PKU)?: (!) Yes(pt's father had type 2 diabetes on insulin)  A child with birth defects not listed above?: No  Recurrent pregnancy loss or stillbirth?: No   Has the patient had any medications/street drugs/alcohol since her last menstrual period?: No  Does the patient or baby's father have any other genetic risks?: No    Infection History   Do you object to being tested for Hepatitis B?: No  Do you object to being tested for HIV?: No   Do you feel that you are at high risk for coming in contact with the AIDS virus?: No  Have you ever been treated for tuberculosis?: No  Have you ever had a positive skin test for tuberculosis?: No  Do you live with someone who has tuberculosis?: No  Have you ever been exposed to tuberculosis?: No  Do you have genital herpes?: No  Does your partner have genital herpes?: No  Have you had a viral illness since your last period?: No  Have you ever had gonorrhea, chlamydia, syphilis, venereal warts, trichomoniasis, pelvic inflammatory disease or any other sexually transmitted disease?: No  Do you know if you are a  genital group B streptococcus carrier?: No  Have you had chicken pox/varicella?: No   Have you been vaccinated against chicken Pox?: (!) Yes  Have you had any other infectious diseases?: No        Dang Stephens RN

## 2019-07-23 LAB
BACTERIA SPEC CULT: NORMAL
C TRACH DNA SPEC QL NAA+PROBE: NEGATIVE
HBV SURFACE AG SERPL QL IA: NONREACTIVE
HIV 1+2 AB+HIV1 P24 AG SERPL QL IA: NONREACTIVE
N GONORRHOEA DNA SPEC QL NAA+PROBE: NEGATIVE
RUBV IGG SERPL IA-ACNC: 131 IU/ML
SPECIMEN SOURCE: NORMAL
T PALLIDUM AB SER QL: NONREACTIVE

## 2019-07-26 ENCOUNTER — ANCILLARY PROCEDURE (OUTPATIENT)
Dept: ULTRASOUND IMAGING | Facility: CLINIC | Age: 24
End: 2019-07-26
Payer: COMMERCIAL

## 2019-07-26 DIAGNOSIS — Z34.81 ENCOUNTER FOR SUPERVISION OF OTHER NORMAL PREGNANCY IN FIRST TRIMESTER: ICD-10-CM

## 2019-07-26 PROCEDURE — 76801 OB US < 14 WKS SINGLE FETUS: CPT | Performed by: OBSTETRICS & GYNECOLOGY

## 2019-08-06 ENCOUNTER — PRENATAL OFFICE VISIT (OUTPATIENT)
Dept: OBGYN | Facility: CLINIC | Age: 24
End: 2019-08-06
Payer: COMMERCIAL

## 2019-08-06 VITALS — BODY MASS INDEX: 39.41 KG/M2 | SYSTOLIC BLOOD PRESSURE: 110 MMHG | WEIGHT: 236.8 LBS | DIASTOLIC BLOOD PRESSURE: 72 MMHG

## 2019-08-06 DIAGNOSIS — O34.219 PREVIOUS CESAREAN DELIVERY, ANTEPARTUM CONDITION OR COMPLICATION: Primary | ICD-10-CM

## 2019-08-06 PROCEDURE — 99214 OFFICE O/P EST MOD 30 MIN: CPT | Performed by: OBSTETRICS & GYNECOLOGY

## 2019-08-06 NOTE — PROGRESS NOTES
New OB Visit  Opal Hillman   2019   11w1d     Subjective: Opal Hillman 24 year old  at 11w1d dated by LMP, early ultrasound here today for initial OB visit. Patient reports No Problems. Denies cramping and vaginal spotting.     Gyn History:   Menses: LMP: Patient's last menstrual period was 2019 (exact date). frequency: q month  Sexually transmitted disease history: none.    Occupation:     H/o Chicken Pox or Varicella Vaccination: Yes    Since her last LMP she denies use of alcohol, tobacco and street drugs.    OBhx: C-sec x 2  OB History    Para Term  AB Living   4 2 2 0 1 2   SAB TAB Ectopic Multiple Live Births   1 0 0 0 2      # Outcome Date GA Lbr Raul/2nd Weight Sex Delivery Anes PTL Lv   4 Current            3 SAB 2019 5w0d          2 Term 18 41w2d  4.37 kg (9 lb 10.2 oz) M CS-LTranv Spinal N WILTON      Name: RAJAN HILLMAN      Apgar1: 9  Apgar5: 9   1 Term 16 41w3d  4.167 kg (9 lb 3 oz) F CS-LTranv EPI, Spinal N WILTON      Complications: Preeclampsia/Hypertension      Name: Kayla       ROS: Ten point review of systems was reviewed and negative except the above.    HISTORY:  Past Medical History:   Diagnosis Date     Hypertension in pregnancy     in first pregnancy     NO ACTIVE PROBLEMS      Past Surgical History:   Procedure Laterality Date      SECTION  2016      SECTION N/A 2018    Procedure:  SECTION;  Repeat  Section;  Surgeon: Frankie Aragon MD;  Location:  L+D     Family History   Problem Relation Age of Onset     Diabetes Father      Thyroid Disease Sister      Thyroid Disease Sister      Thyroid Disease Sister      Social History     Socioeconomic History     Marital status:      Spouse name: Basilio     Number of children: 1     Years of education: Not on file     Highest education level: Not on file   Occupational History     Occupation:    Social  Needs     Financial resource strain: Not on file     Food insecurity:     Worry: Not on file     Inability: Not on file     Transportation needs:     Medical: Not on file     Non-medical: Not on file   Tobacco Use     Smoking status: Never Smoker     Smokeless tobacco: Never Used   Substance and Sexual Activity     Alcohol use: No     Drug use: No     Sexual activity: Yes     Partners: Male   Lifestyle     Physical activity:     Days per week: Not on file     Minutes per session: Not on file     Stress: Not on file   Relationships     Social connections:     Talks on phone: Not on file     Gets together: Not on file     Attends Voodoo service: Not on file     Active member of club or organization: Not on file     Attends meetings of clubs or organizations: Not on file     Relationship status: Not on file     Intimate partner violence:     Fear of current or ex partner: Not on file     Emotionally abused: Not on file     Physically abused: Not on file     Forced sexual activity: Not on file   Other Topics Concern     Not on file   Social History Narrative     Not on file     Current Outpatient Medications   Medication Sig     cholecalciferol (VITAMIN D3) 1000 UNIT tablet Take by mouth daily     Prenatal Vit-Fe Fumarate-FA (PRENATAL MULTIVITAMIN PLUS IRON) 27-0.8 MG TABS per tablet Take 1 tablet by mouth daily     No current facility-administered medications for this visit.      No Known Allergies    Past medical, surgical, social and family history were reviewed and updated in [facility].      EXAM:  LMP 05/20/2019 (Exact Date)      Gen:  no acute distress, comfortable  HENT: No scleral injection or icterus  CV: Regular rate and rhythm, no murmur  Resp: CTAB, Normal work of breathing, no cough  GI: Abdomen soft, non-tender. No masses, organomegaly  Skin: No suspicious lesions or rashes  Psychiatric: mentation appears normal and affect bright   Pelvis: deferred.  FHTs 160       Recent Labs   Lab Test 07/22/19  0927   ABO  A   RH Pos   AS Neg     Rhogam not indicated     Recent Labs   Lab Test 19  0926 18  1841   HEPBANG Nonreactive  --    HIAGAB Nonreactive  --    GBS  --  Negative   RUQIGG 131  --        Treponemal antibody neg    CBC RESULTS:   Recent Labs   Lab Test 19  09   WBC 8.9   RBC 4.57   HGB 13.8   HCT 42.4   MCV 93   MCH 30.2   MCHC 32.5   RDW 13.6          ASSESSMENT:  24 year old  at 11w1d dated by LMP and early U/S here for NOB visit.  Patient has had 2 prior C/S    PLAN:    1)Prenatal labs reviewed. She has no questions.  2) EDUCATION : RECOMMENDED WEIGHT GAIN: 15-25 lbs given There is no height or weight on file to calculate BMI..   - Instructed on best evidence for: healthy diet and foods to avoid; exercise and activity during pregnancy; and maintenance of a generally healthy lifestyle. Reviewed early pregnancy education, provider coverage, labor and delivery, and prenatal visits.  Discussed the harms, benefits, side effects and alternative therapies for current prescribed and OTC medications.  - recommend PNV  3) Discussed options for screening for chromosomal anomalies, including first screen, noninvasive prenatal testing, CVS/amniocentesis, quad screen, and ultrasound at 18-20 weeks. She is electing ultrasound at 18-20 weeks.  4) Will schedule RCS after midtrimester U/S confirms EDC    Follow up in 4 weeks. She is encouraged to call sooner with questions or concerns.    Gage Jorgensen MD   Obstetrics and Gynecology

## 2019-08-06 NOTE — NURSING NOTE
"Chief Complaint   Patient presents with     Prenatal Care     NPN Provider visit   11w1d  C/o URI x 3d and pink eye x 1 day    Initial /72   Wt 107.4 kg (236 lb 12.8 oz)   LMP 2019 (Exact Date)   BMI 39.41 kg/m   Estimated body mass index is 39.41 kg/m  as calculated from the following:    Height as of 19: 1.651 m (5' 5\").    Weight as of this encounter: 107.4 kg (236 lb 12.8 oz).  BP completed using cuff size: large    Questioned patient about current smoking habits.  Pt. has never smoked.          The following HM Due: NONE    Tri Escobar CMA             "

## 2019-09-24 DIAGNOSIS — O09.92 HIGH-RISK PREGNANCY IN SECOND TRIMESTER: Primary | ICD-10-CM

## 2019-10-07 ENCOUNTER — ANCILLARY PROCEDURE (OUTPATIENT)
Dept: ULTRASOUND IMAGING | Facility: CLINIC | Age: 24
End: 2019-10-07
Attending: OBSTETRICS & GYNECOLOGY
Payer: MEDICAID

## 2019-10-07 DIAGNOSIS — O09.92 HIGH-RISK PREGNANCY IN SECOND TRIMESTER: ICD-10-CM

## 2019-10-07 PROCEDURE — 76805 OB US >/= 14 WKS SNGL FETUS: CPT | Performed by: FAMILY MEDICINE

## 2019-10-09 ENCOUNTER — PRENATAL OFFICE VISIT (OUTPATIENT)
Dept: OBGYN | Facility: CLINIC | Age: 24
End: 2019-10-09
Payer: MEDICAID

## 2019-10-09 VITALS — SYSTOLIC BLOOD PRESSURE: 114 MMHG | BODY MASS INDEX: 40.49 KG/M2 | WEIGHT: 243.3 LBS | DIASTOLIC BLOOD PRESSURE: 68 MMHG

## 2019-10-09 DIAGNOSIS — Z34.82 ENCOUNTER FOR SUPERVISION OF OTHER NORMAL PREGNANCY IN SECOND TRIMESTER: Primary | ICD-10-CM

## 2019-10-09 PROCEDURE — 99212 OFFICE O/P EST SF 10 MIN: CPT | Performed by: ADVANCED PRACTICE MIDWIFE

## 2019-10-09 NOTE — PROGRESS NOTES
S: Feels well and has no concerns.,  Has started feeling fetal movement.  Denies uterine cramping, vaginal bleeding or leaking of fluid  O: Vitals: /68 (BP Location: Left arm, Patient Position: Chair, Cuff Size: Adult Large)   Wt 110.4 kg (243 lb 4.8 oz)   LMP 05/20/2019 (Exact Date)   Breastfeeding? No   BMI 40.49 kg/m    BMI= Body mass index is 40.49 kg/m .  Exam:  Constitutional: healthy, alert and no distress  Respiratory: respirations even and unlabored  Gastrointestinal: Abdomen soft, non-tender. Fundus measures appropriate for gestational age. Fetal heart tones hear without difficulty and within normal limits  : Deferred  Psychiatric: mentation appears normal and affect normal/bright  A:    Diagnosis Comments   1. Encounter for supervision of other normal pregnancy in second trimester       P: Discussed 20 week fetal screen.   Encouraged patient to call with any questions or concerns.      LEOPOLDO Hall, CNM

## 2019-10-09 NOTE — PATIENT INSTRUCTIONS
Weeks 21 thru 23 - Gestational Age (Fetal Age - Weeks 19 thru 21):  You may be feeling fetal movement every day now. Lanugo now covers the fetus s entire body. The fetus is beginning to have the look of a  infant as the skin becomes less see through and  fat begins to develop. All the parts of the eyes are developed. The liver and pancreas are working hard to develop completely. The fetus has reached about 10-11 inches in length and weighs about 1 - 1   pounds

## 2019-10-09 NOTE — NURSING NOTE
"Chief Complaint   Patient presents with     Prenatal Care       Initial /68 (BP Location: Left arm, Patient Position: Chair, Cuff Size: Adult Large)   Wt 110.4 kg (243 lb 4.8 oz)   LMP 2019 (Exact Date)   Breastfeeding? No   BMI 40.49 kg/m   Estimated body mass index is 40.49 kg/m  as calculated from the following:    Height as of 19: 1.651 m (5' 5\").    Weight as of this encounter: 110.4 kg (243 lb 4.8 oz).  BP completed using cuff size: large    Questioned patient about current smoking habits.  Pt. has never smoked.      20w2d      The following HM Due: NONE      FM not able to feel yet         Elisabeth oHffman, CMA on 10/9/2019 at 2:22 PM             "

## 2019-11-06 ENCOUNTER — PRENATAL OFFICE VISIT (OUTPATIENT)
Dept: OBGYN | Facility: CLINIC | Age: 24
End: 2019-11-06
Payer: MEDICAID

## 2019-11-06 ENCOUNTER — PREP FOR PROCEDURE (OUTPATIENT)
Dept: OBGYN | Facility: CLINIC | Age: 24
End: 2019-11-06

## 2019-11-06 ENCOUNTER — TELEPHONE (OUTPATIENT)
Dept: OBGYN | Facility: CLINIC | Age: 24
End: 2019-11-06

## 2019-11-06 VITALS — BODY MASS INDEX: 41.97 KG/M2 | WEIGHT: 252.2 LBS | SYSTOLIC BLOOD PRESSURE: 104 MMHG | DIASTOLIC BLOOD PRESSURE: 64 MMHG

## 2019-11-06 DIAGNOSIS — Z98.891 PREVIOUS CESAREAN SECTION: Primary | ICD-10-CM

## 2019-11-06 DIAGNOSIS — Z34.82 ENCOUNTER FOR SUPERVISION OF OTHER NORMAL PREGNANCY IN SECOND TRIMESTER: Primary | ICD-10-CM

## 2019-11-06 DIAGNOSIS — Z98.891 S/P C-SECTION: ICD-10-CM

## 2019-11-06 PROCEDURE — 99212 OFFICE O/P EST SF 10 MIN: CPT | Performed by: OBSTETRICS & GYNECOLOGY

## 2019-11-06 NOTE — PROGRESS NOTES
IUP at 24w2d here for routine visit.  No problems or concerns.    Will schedule RCS for 2/26/20 (Pt desires this date).    RTC 4 weeks

## 2019-11-06 NOTE — TELEPHONE ENCOUNTER
Procedure name(s) - multi select  Delivery    Reason for procedure Prior  section x 2    Surgeon: Jameel    Is this a multi surgeon case? No    Laterality N/A    Request for additional equipment Other (see comments) None   Anesthesia Spinal    Positioning (if different from preference card): Supine    Initiate Pre-op orders for above procedure: Yes, as ordered in Epic additional orders noted there also   Location of Case: [facility]    Operating room  requested: No    Urgency of Surgery: Routine    Surgeon Procedure Time (incision to closure) in minutes (per procedure as applicable) 60    Note:  Surgical Case Time Needed (in minutes)   Surgery Date: 39-40 weeks 20   Time of Surgery (Requested): 7:30 AM    Patient Class (for admit prior to surgery, specify number of days in comments): Surgery admit admit day of surgery   H&P To Be Completed By: Surgeon    Where is the note? In EpicProgress Note    Post-Op Appointment 6 weeks    Vendor Needed? No

## 2019-11-06 NOTE — NURSING NOTE
"Chief Complaint   Patient presents with     Prenatal Care   24w2d  No concerns    initial /64   Wt 114.4 kg (252 lb 3.2 oz)   LMP 05/20/2019 (Exact Date)   BMI 41.97 kg/m   Estimated body mass index is 41.97 kg/m  as calculated from the following:    Height as of 7/22/19: 1.651 m (5' 5\").    Weight as of this encounter: 114.4 kg (252 lb 3.2 oz).  BP completed using cuff size large.  Tri Escobar CMA    "

## 2019-12-12 ENCOUNTER — PRENATAL OFFICE VISIT (OUTPATIENT)
Dept: OBGYN | Facility: CLINIC | Age: 24
End: 2019-12-12
Payer: MEDICAID

## 2019-12-12 VITALS — WEIGHT: 258.4 LBS | SYSTOLIC BLOOD PRESSURE: 118 MMHG | DIASTOLIC BLOOD PRESSURE: 74 MMHG | BODY MASS INDEX: 43 KG/M2

## 2019-12-12 DIAGNOSIS — Z98.891 S/P C-SECTION: ICD-10-CM

## 2019-12-12 DIAGNOSIS — Z34.83 ENCOUNTER FOR SUPERVISION OF OTHER NORMAL PREGNANCY IN THIRD TRIMESTER: Primary | ICD-10-CM

## 2019-12-12 LAB
ERYTHROCYTE [DISTWIDTH] IN BLOOD BY AUTOMATED COUNT: 13.2 % (ref 10–15)
HCT VFR BLD AUTO: 35.6 % (ref 35–47)
HGB BLD-MCNC: 11.7 G/DL (ref 11.7–15.7)
MCH RBC QN AUTO: 31 PG (ref 26.5–33)
MCHC RBC AUTO-ENTMCNC: 32.9 G/DL (ref 31.5–36.5)
MCV RBC AUTO: 94 FL (ref 78–100)
PLATELET # BLD AUTO: 311 10E9/L (ref 150–450)
RBC # BLD AUTO: 3.78 10E12/L (ref 3.8–5.2)
WBC # BLD AUTO: 8.2 10E9/L (ref 4–11)

## 2019-12-12 PROCEDURE — 82950 GLUCOSE TEST: CPT | Performed by: OBSTETRICS & GYNECOLOGY

## 2019-12-12 PROCEDURE — 99212 OFFICE O/P EST SF 10 MIN: CPT | Performed by: OBSTETRICS & GYNECOLOGY

## 2019-12-12 PROCEDURE — 85027 COMPLETE CBC AUTOMATED: CPT | Performed by: OBSTETRICS & GYNECOLOGY

## 2019-12-12 PROCEDURE — 86780 TREPONEMA PALLIDUM: CPT | Performed by: OBSTETRICS & GYNECOLOGY

## 2019-12-12 PROCEDURE — 36415 COLL VENOUS BLD VENIPUNCTURE: CPT | Performed by: OBSTETRICS & GYNECOLOGY

## 2019-12-12 NOTE — NURSING NOTE
"Chief Complaint   Patient presents with     Prenatal Care     1 hour GTT, 28 week labs and Tdap         Initial /74 (BP Location: Right arm, Patient Position: Chair, Cuff Size: Adult Large)   Wt 117.2 kg (258 lb 6.4 oz)   LMP 2019 (Exact Date)   Breastfeeding No   BMI 43.00 kg/m   Estimated body mass index is 43 kg/m  as calculated from the following:    Height as of 19: 1.651 m (5' 5\").    Weight as of this encounter: 117.2 kg (258 lb 6.4 oz).  BP completed using cuff size: large    Questioned patient about current smoking habits.  Pt. has never smoked.    29w3d      The following HM Due: NONE      +FM daily     +Feeling well       Declined Tdap     Elisabeth Hoffman, CMA on 2019 at 11:42 AM       "

## 2019-12-12 NOTE — PROGRESS NOTES
IUP at 29w3d here for routine visit.  No problems or concerns.    GCT/Hgb/Trep today.  TdaP declined.    RTC 3 weeks

## 2019-12-13 DIAGNOSIS — R73.09 ABNORMAL GLUCOSE TOLERANCE TEST: Primary | ICD-10-CM

## 2019-12-13 LAB
GLUCOSE 1H P 50 G GLC PO SERPL-MCNC: 131 MG/DL (ref 60–129)
T PALLIDUM AB SER QL: NONREACTIVE

## 2019-12-27 DIAGNOSIS — R73.09 ABNORMAL GLUCOSE TOLERANCE TEST: ICD-10-CM

## 2019-12-27 LAB
GLUCOSE 1H P 100 G GLC PO SERPL-MCNC: 202 MG/DL (ref 60–179)
GLUCOSE 2H P 100 G GLC PO SERPL-MCNC: 139 MG/DL (ref 60–154)
GLUCOSE 3H P 100 G GLC PO SERPL-MCNC: 122 MG/DL (ref 60–139)
GLUCOSE P FAST SERPL-MCNC: 120 MG/DL (ref 60–94)

## 2019-12-27 PROCEDURE — 82951 GLUCOSE TOLERANCE TEST (GTT): CPT | Performed by: OBSTETRICS & GYNECOLOGY

## 2019-12-27 PROCEDURE — 82952 GTT-ADDED SAMPLES: CPT | Performed by: OBSTETRICS & GYNECOLOGY

## 2019-12-27 PROCEDURE — 36415 COLL VENOUS BLD VENIPUNCTURE: CPT | Performed by: OBSTETRICS & GYNECOLOGY

## 2019-12-28 DIAGNOSIS — O24.419 GESTATIONAL DIABETES MELLITUS (GDM) IN THIRD TRIMESTER, GESTATIONAL DIABETES METHOD OF CONTROL UNSPECIFIED: Primary | ICD-10-CM

## 2019-12-30 ENCOUNTER — TELEPHONE (OUTPATIENT)
Dept: OBGYN | Facility: CLINIC | Age: 24
End: 2019-12-30

## 2020-01-10 ENCOUNTER — PRENATAL OFFICE VISIT (OUTPATIENT)
Dept: OBGYN | Facility: CLINIC | Age: 25
End: 2020-01-10

## 2020-01-10 VITALS
BODY MASS INDEX: 44.18 KG/M2 | DIASTOLIC BLOOD PRESSURE: 74 MMHG | HEART RATE: 89 BPM | SYSTOLIC BLOOD PRESSURE: 106 MMHG | WEIGHT: 265.5 LBS

## 2020-01-10 DIAGNOSIS — Z34.83 ENCOUNTER FOR SUPERVISION OF OTHER NORMAL PREGNANCY IN THIRD TRIMESTER: Primary | ICD-10-CM

## 2020-01-10 DIAGNOSIS — O24.419 GESTATIONAL DIABETES MELLITUS (GDM) IN THIRD TRIMESTER, GESTATIONAL DIABETES METHOD OF CONTROL UNSPECIFIED: ICD-10-CM

## 2020-01-10 PROCEDURE — 99207 ZZC PRENATAL VISIT: CPT | Performed by: OBSTETRICS & GYNECOLOGY

## 2020-01-10 NOTE — NURSING NOTE
"Chief Complaint   Patient presents with     Prenatal Care     GDM appt on the        Initial /74 (BP Location: Right arm, Patient Position: Chair, Cuff Size: Adult Large)   Pulse 89   Wt 120.4 kg (265 lb 8 oz)   LMP 2019 (Exact Date)   Breastfeeding No   BMI 44.18 kg/m   Estimated body mass index is 44.18 kg/m  as calculated from the following:    Height as of 19: 1.651 m (5' 5\").    Weight as of this encounter: 120.4 kg (265 lb 8 oz).  BP completed using cuff size: large    Questioned patient about current smoking habits.  Pt. has never smoked.    33w4d      The following HM Due: NONE      +FM Daily  +GDM appt on the      Elisabeth Hoffman CMA on 1/10/2020 at 3:41 PM                 "

## 2020-01-10 NOTE — PROGRESS NOTES
Doing well.   Reports usual back and lower pelvic pain  Had visited the chiropractor today but denies any alleviation of her discomfort.   Denies VB, ctx, LOF. +FM  /74 (BP Location: Right arm, Patient Position: Chair, Cuff Size: Adult Large)   Pulse 89   Wt 120.4 kg (265 lb 8 oz)   LMP 2019 (Exact Date)   Breastfeeding No   BMI 44.18 kg/m    General Appearance: NAD  Abdomen: Gravid, NT  Refer to flow sheet above.   A/P: 24 year old  at 33w4d  -- GDMA1: she has a scheduled follow-up visit with diabetic educator/dietician to review blood glucose testing and diet modification  -- hx of  x 2: repeat scheduled on  with Dr. Jorgensen  -- PTL precautions reviewed  RTC in 1 week    Jd Brown MD  [facility]

## 2020-01-14 ENCOUNTER — ALLIED HEALTH/NURSE VISIT (OUTPATIENT)
Dept: EDUCATION SERVICES | Facility: CLINIC | Age: 25
End: 2020-01-14
Payer: COMMERCIAL

## 2020-01-14 DIAGNOSIS — O24.419 GESTATIONAL DIABETES: Primary | ICD-10-CM

## 2020-01-14 PROCEDURE — G0108 DIAB MANAGE TRN  PER INDIV: HCPCS

## 2020-01-14 RX ORDER — LANCETS
1 EACH MISCELLANEOUS 4 TIMES DAILY
Qty: 102 EACH | Refills: 3 | Status: SHIPPED | OUTPATIENT
Start: 2020-01-14

## 2020-01-14 RX ORDER — BLOOD SUGAR DIAGNOSTIC
STRIP MISCELLANEOUS
Qty: 100 EACH | Refills: 3 | Status: SHIPPED | OUTPATIENT
Start: 2020-01-14 | End: 2020-02-10

## 2020-01-14 NOTE — PROGRESS NOTES
Diabetes Self-Management Education & Support    Gestational Diabetes Self-Management Education & Support    SUBJECTIVE/OBJECTIVE:  Presents for education related to gestational diabetes.    Accompanied by: Self  Diabetes management related comments/concerns: (P) no    Cultural Influences/Ethnic Background:  American    Estimated Date of Delivery: 2020    1 hour OGTT  Lab Results   Component Value Date    GLU1 131 (H) 2019       3 hour OGTT    Fasting  Lab Results   Component Value Date     (H) 2019       1 hour  Lab Results   Component Value Date    GL1 202 (H) 2019       2 hour  Lab Results   Component Value Date    GL2 139 2019       3 hour  Lab Results   Component Value Date    GL3 122 2019       Lifestyle and Health Behaviors:  Pre-pregnancy weight (lbs): (P) 240  Barrier to exercise: (P) Physical limitation  Meals include: (P) Breakfast, Lunch, Dinner, Snacks  Beverages: (P) Water, Tea, Milk, Juice  Cultural/Mandaen diet restrictions?: (P) No  Biggest challenges to healthy eating: (P) Portion control, Eating out, Evening snacking, Lack of time  Pre- vitamin?: (P) Yes  Supplements?: (P) No  Experiencing nausea?: (P) No    Healthy Coping:  Emotional response to diabetes: Ready to learn  Informal Support system:: (P) Children, Family, Friends, Parent, Spouse  Stage of change: PREPARATION (Decided to change - considering how)    Current Management:  Taking medications for gestational diabetes?: No    ASSESSMENT:  Opal has a family history of diabetes, and had been checking her numbers in the morning with her mom's meter. Her readings were usually 90-95.     INTERVENTION:  Patient was instructed on Accu-Chek Guide meter and was able to provide an accurate return demonstration. Patient's blood glucose reading today was 150 mg/dL 1 hour after breakfast.    Educational topics covered today:  GDM diagnosis, pathophysiology, Risks and Complications of GDM, Means of  controlling GDM, Using a Blood Glucose Monitor, Blood Glucose Goals, Logging and Interpreting Glucose Results, Ketone Testing, When to Call a Diabetes Educator or OB Provider, Healthy Eating During Pregnancy, Counting Carbohydrates, Meal Planning for GDM, and Physical Activity    Educational materials provided today:   Ross Understanding Gestational Diabetes  GDM Log Book  Sharps Disposal  Care After Delivery  Accu-Chek Guide meter kit    Pt verbalized understanding of concepts discussed and recommendations provided today.     PLAN:  Check glucose 4 times daily, before breakfast and 1 hour after each meal.     Check Ketones daily for one week, if negative, reduce testing to once a week.     Physical activity recommended: as able.    Meal plan: 2-3 carbs at breakfast, 3-4 carbs at lunch, 3-4 carbs at supper, 1-2 carbs at 3 snacks a day.  Follow consistent CHO meal plan, eat CHO and protein/fat at all meals/snacks.    Call/e-mail/MyChart message diabetes educator if 3 or more blood sugars are above the goal in 1 week, if ketones are positive, or with questions/concerns.    IVAN Weinberg CDE  Time Spent: 60 minutes  Encounter Type: Individual    Any diabetes medication dose changes were made via the CDE Protocol and Collaborative Practice Agreement with the patient's OB/GYN provider. A copy of this encounter was shared with the provider.

## 2020-01-16 ENCOUNTER — PATIENT OUTREACH (OUTPATIENT)
Dept: EDUCATION SERVICES | Facility: CLINIC | Age: 25
End: 2020-01-16

## 2020-01-16 NOTE — PROGRESS NOTES
Opal emailed:  Hi I started my sugar testing Tuesday and I have 3 numbers out of the normal range. And I have a trace in my urine ketones test.       Email her back:  Abdelrahman Joseph,   Thanks for the update!  I tried to call thinking that might be quicker 12  Can you email a picture of the food log too? Or just answer a couple questions?  It's helpful to know what you had for bedtime snack on the 14th and 15th, and what was for breakfast this morning.     There might be some room for fine tuning food choices?  If I can, I'll make a couple of suggestions today.   Thank you! Judi     Reply:      Assessment:  She is not yet fully following the meal plan- did not eat HS snack as recommended, no stand-alone protein for breakfast today.     With readings >110, it's likely she will need insulin, but will encourage dietary change first.     Reply:  Awesome, thanks Opal!  You are doing super with the blood sugar checks and ketones!   Lunch and dinner yesterday were perfectly balanced with carb (rice or potato) with protein (chicken, egg, steak) and veggies too.     I think some fine tuning to the meal plan could help those morning numbers come down let s try!  ~ The most important thing that can help morning numbers is the bed time snack. As close to bed time as possible, even if dinner is late.   The recommended bed-time snack is 30 grams carb + a generous protein, same recommendation as breakfast 12  For instance, 2 bread slices with meat (and cheese if desired) to make a sandwich, or crackers and a cheese with a glass of milk, or a fruit with cottage cheese and a glass of milk like that.    ~ Today s breakfast was close! The bread and milk = 30 grams carb, and the peanut butter was a good start, but the meal needs a little more protein- like an egg, cheese, rosales or sausage, steak that helps keep the number down after.  It looks like yesterday s breakfast didn t have any carb so needed a cup of milk, a bread, a  potato, something to get to 30 grams.     I know the meal plan with gestational diabetes is unusual! It s very particular about combining carb + protein and each meal and at the bed time snack in hopes of seeing the best possible numbers.   The trace ketones mean the body did not get enough food the night before (no snack to feed mom and baby all night).    If you can experiment with adding in a carb + protein snack at bedtime each night, and having a balanced meal at breakfast, we ll see how it looks when you are in on the 21st 12   Have a nice weekend!  Main[place], Judi Mackenzie RD, LD, CDE

## 2020-01-21 ENCOUNTER — ALLIED HEALTH/NURSE VISIT (OUTPATIENT)
Dept: EDUCATION SERVICES | Facility: CLINIC | Age: 25
End: 2020-01-21
Payer: COMMERCIAL

## 2020-01-21 ENCOUNTER — TELEPHONE (OUTPATIENT)
Dept: EDUCATION SERVICES | Facility: CLINIC | Age: 25
End: 2020-01-21

## 2020-01-21 DIAGNOSIS — O24.414 INSULIN CONTROLLED GESTATIONAL DIABETES MELLITUS (GDM) IN THIRD TRIMESTER: Primary | ICD-10-CM

## 2020-01-21 PROCEDURE — G0108 DIAB MANAGE TRN  PER INDIV: HCPCS

## 2020-01-21 NOTE — TELEPHONE ENCOUNTER
Patient has all elevated fasting blood sugars x1 week, (See encounter from 1/21), would recommend initiation of 24 units NPH (0.2u/kg/day) - please sign pended order if you are in agreement with this plan.    Thank you!  Randa Suh, IVAN LD CDE

## 2020-01-21 NOTE — PROGRESS NOTES
Diabetes Self-Management Education & Support  Gestational Diabetes Self-Management Education & Support    SUBJECTIVE/OBJECTIVE:  Presents for education related to gestational diabetes.    Accompanied by: Self  Diabetes management related comments/concerns: no    Cultural Influences/Ethnic Background:  American    LMP 05/20/2019 (Exact Date)     Wt Readings from Last 1 Encounters:   01/10/20 120.4 kg (265 lb 8 oz)       Estimated Date of Delivery: Feb 24, 2020    Blood Glucose/Ketone Log:   Date Ketones Fasting Post Breakfast Post Lunch Post Supper   1/17 T 108 106 107 109   1/16 T 112 143 109 95   1/17 T 101 98 110 94   1/18 T 106 94 104 118   1/19 N 102 106 112    1/20 N 109 103 138    1/21  113          Lifestyle and Health Behaviors:  Exercise:: Yes  Meals include: Breakfast, Lunch, Dinner, Snacks  Beverages: Water  Biggest challenges to healthy eating: Lack of time    Healthy Coping:  Emotional response to diabetes: Ready to learn  Stage of change: ACTION (Actively working towards change)    Current Management:  Taking medications for gestational diabetes?: No    ASSESSMENT:  Ketones: neg x2.   Fasting blood glucoses: 0% in target.  After breakfast: 83% in target.  After lunch: 100% in target.  After dinner: 100% in target.    Opal is not following the meal plan. She is avoiding eating carbohydrate at breakfast, has a high protein lunch and dinner, and is then not hungry for carbohydrates at these meals, or at bedtime. She was asked to change her diet last week, but did not do so, and has persisted to have elevated fasting numbers.     INTERVENTION:  Educational topics covered today:  What to expect after delivery, Future testing for Type 2 diabetes (2 hour OGTT at 6 week post-partum check-up and annual fasting blood glucose level), Risk of GDM and planning ahead for future pregnancies, Recommended lifestyle interventions for reducing the risk of Type 2 Diabetes, When to Call a Diabetes Educator or OB  Provider    Reviewed insulin via pen (gave Jayla voucher) as patient will likely need to start insulin today.       Educational Materials provided today:  Ross Preventing Diabetes    PLAN:  Check glucose 4 times daily.  Check ketones once a week when readings are consistently negative.  Continue with recommended physical activity.  Continue to follow recommended meal plan: 2-3 carbs at breakfast, 3-4 carbs at lunch, 3-4 carbs at supper, 1-2 carbs at snacks.  Follow consistent CHO meal plan, eat CHO and protein/fat at all meals/snacks.  Sending request to start 24 units NPH (0.2u/kg/day) to OB  Follow up Friday    Call/e-mail/DashBursthart message diabetes educator if 3 or more blood sugars are above the goal in 1 week or if ketones are positive.    IVAN Weinberg CDE  Time Spent: 30 minutes  Encounter Type: Individual    Any diabetes medication dose changes were made via the CDE Protocol and Collaborative Practice Agreement with the patient's OB/GYN provider. A copy of this encounter was shared with the provider.

## 2020-01-22 ENCOUNTER — PRENATAL OFFICE VISIT (OUTPATIENT)
Dept: OBGYN | Facility: CLINIC | Age: 25
End: 2020-01-22
Payer: COMMERCIAL

## 2020-01-22 VITALS — WEIGHT: 263 LBS | BODY MASS INDEX: 43.77 KG/M2 | DIASTOLIC BLOOD PRESSURE: 79 MMHG | SYSTOLIC BLOOD PRESSURE: 118 MMHG

## 2020-01-22 DIAGNOSIS — Z34.83 ENCOUNTER FOR SUPERVISION OF OTHER NORMAL PREGNANCY IN THIRD TRIMESTER: Primary | ICD-10-CM

## 2020-01-22 DIAGNOSIS — O24.419 GESTATIONAL DIABETES MELLITUS (GDM) IN THIRD TRIMESTER, GESTATIONAL DIABETES METHOD OF CONTROL UNSPECIFIED: ICD-10-CM

## 2020-01-22 PROCEDURE — 99207 ZZC PRENATAL VISIT: CPT | Performed by: OBSTETRICS & GYNECOLOGY

## 2020-01-22 NOTE — PROGRESS NOTES
IUP at 35w2d .  Prior C/S x 2 with RCS on 2/26/20.  Now with GDM,A2 - insulin 24un NPH at HS called in yesterday due to high Fasting BS levels.    RTC 1 week.  Weekly BPPs to be scheduled.  RTC 1 week.

## 2020-01-22 NOTE — NURSING NOTE
"Chief Complaint   Patient presents with     Prenatal Care     35 2/7 weeks       Initial /79   Wt 119.3 kg (263 lb)   LMP 2019 (Exact Date)   BMI 43.77 kg/m   Estimated body mass index is 43.77 kg/m  as calculated from the following:    Height as of 19: 1.651 m (5' 5\").    Weight as of this encounter: 119.3 kg (263 lb).  BP completed using cuff size: large    Questioned patient about current smoking habits.  Pt. has never smoked.          The following HM Due: NONE    +fetal movement  -swelling    Aspen Ragsdale, CMA    "

## 2020-01-27 ENCOUNTER — PATIENT OUTREACH (OUTPATIENT)
Dept: EDUCATION SERVICES | Facility: CLINIC | Age: 25
End: 2020-01-27

## 2020-01-27 NOTE — PROGRESS NOTES
E-mail from patient:  From: Opal Hillman <[email or web address]   Sent: 2020 1:05 PM  To: [email or web address]  Subject: Re: Opal Hillman    The pharmacy sent you guys a fax about my prescription being wrong. The voucher I got was for the wrong thing. Please let me know what to do.   Sent from my iPhone      E-mail reply to patient:  Abdelrahman Joseph,    Thanks for letting us know.  I didn t see anything in your chart about [facility] contacting us, but it typically first goes to your OB and sometimes not put in the chart fast enough.  I am guessing your insurance only cover s the vials of insulin, so using a syringe and a vial to inject.  I have also contacted the coupon reps from [employer] regarding denying the  coupon, because this is not the first time it has happened.    I would like to give them maybe 2 more hours, if I don t hear anything back we might have to move on.  There are 2 options if the coupon doesn t work.      1. Switch to vial and syringe, but you d either need to let me know if the video education is ok OR come in quick to learn technique.  2. http://www.fpanetwork.org/clinical-integration/health-resources/diabetes/index.htm (click on cloudy insulin demo)  3. Cash pay for insulin, which is $45 for 5 pens (enough for the remainder of your pregnancy)    Do you have a preference?        Piper Vargas, MS, RD, LD, CDE  Adult Diabetes   [facility]  Diabetes Education  [email or web address]  [email or web address]   Adult Diabetes Education Triage [phone]  Gender pronouns: she/her  Employed by [place] [employer] [facility]

## 2020-01-27 NOTE — PROGRESS NOTES
CDE called patient: Patient plans to go to pharmacy [facility] and review coupon coverage with pharmacist.  IF it won't work patient will let us know and will be switched to vial and syringe version.  Patient has seen her dad use this in the past and is ok with it.  Will review technique if switch is needed.    Piper Vargas MS, RD, LD, CDE

## 2020-01-29 NOTE — TELEPHONE ENCOUNTER
Dr. Jorgensen will not be available on 20.  Patient requested to reschedule  to 20 with Dr. Jorgensen.    Type of surgery:  delivery  Location of surgery: [facility]  Date and time of surgery: 20 @ 12:00 pm  Surgeon: Dr. Jorgensen  Pre-Op Appt Date: @ prenatal appointment  Post-Op Appt Date: Patient advised to schedule   Packet sent out: Yes  Pre-cert/Authorization completed:  No  Date: 20

## 2020-01-31 ENCOUNTER — PATIENT OUTREACH (OUTPATIENT)
Dept: EDUCATION SERVICES | Facility: CLINIC | Age: 25
End: 2020-01-31

## 2020-01-31 NOTE — PROGRESS NOTES
CDE outreach call: Patient has not followed up since picking up insulin, will request blood sugars.  Left message.  Will send E-mail based on communication success this week.    Piper Vargas MS, RD, LD, CDE    E-mail sent to patient:  Abdelrahman Joseph,    How has it been going since picking up the insulin?  Just wondering how your blood sugars are doing.  Would you be able to send us a picture today with your updated log, preferably by 3pm?  Otherwise Monday 2/3.  It s important to see if the initial dose was enough otherwise increase the dose if you are still having high readings.  We do also anticipate needing to go up 1-2 times per week as hormones get stronger.      Let me know if you have questions!      Piper Vargas, MS, RD, LD, CDE  Adult Diabetes

## 2020-02-03 ENCOUNTER — PATIENT OUTREACH (OUTPATIENT)
Dept: EDUCATION SERVICES | Facility: CLINIC | Age: 25
End: 2020-02-03

## 2020-02-03 NOTE — PROGRESS NOTES
Gestational Diabetes Follow-up    Subjective/Objective:    Opal Hillman sent in blood glucose log for review. Last date of communication was: 1/27.    Gestational diabetes is being managed with diet, activity and medications    Taking diabetes medications:   yes:     Diabetes Medication(s)     Insulin       insulin isophane human (HUMULIN N PEN) 100 UNIT/ML injection    24 units before bed + 2 unit prime = 26 units daily          Estimated Date of Delivery: Feb 24, 2020    BG/Food Log:       Assessment:    Ketones: negative.   Fasting blood glucoses: 100% in target.  After breakfast: 100% in target.  After lunch: 100% in target.  After dinner: 100% in target.    Plan/Response:  Follow-up in 1 week.  Email to patient:  Marina Joseph!    Thanks for sending us an update on your blood sugars.      Your blood sugars are looking really healthy, let s continue with your current dose of insulin for now.      Please send us another update in 1 week.     Thanks!    Lorraine Schneider, IVAN, LD, CDE    Any diabetes medication dose changes were made via the CDE Protocol and Collaborative Practice Agreement with the patient's referring provider. A copy of this encounter was shared with the provider.

## 2020-02-05 ENCOUNTER — PRENATAL OFFICE VISIT (OUTPATIENT)
Dept: OBGYN | Facility: CLINIC | Age: 25
End: 2020-02-05
Payer: COMMERCIAL

## 2020-02-05 VITALS — DIASTOLIC BLOOD PRESSURE: 68 MMHG | SYSTOLIC BLOOD PRESSURE: 116 MMHG | BODY MASS INDEX: 45.1 KG/M2 | WEIGHT: 271 LBS

## 2020-02-05 DIAGNOSIS — Z98.891 S/P C-SECTION: ICD-10-CM

## 2020-02-05 DIAGNOSIS — Z34.83 ENCOUNTER FOR SUPERVISION OF OTHER NORMAL PREGNANCY IN THIRD TRIMESTER: Primary | ICD-10-CM

## 2020-02-05 DIAGNOSIS — O24.419 GESTATIONAL DIABETES MELLITUS (GDM) IN THIRD TRIMESTER, GESTATIONAL DIABETES METHOD OF CONTROL UNSPECIFIED: ICD-10-CM

## 2020-02-05 PROCEDURE — 99207 ZZC PRENATAL VISIT: CPT | Performed by: OBSTETRICS & GYNECOLOGY

## 2020-02-05 PROCEDURE — 87653 STREP B DNA AMP PROBE: CPT | Performed by: OBSTETRICS & GYNECOLOGY

## 2020-02-05 NOTE — PROGRESS NOTES
23yo  at 37w2d here for scheduled visit.  GDM,A2 on indulin.  Scheduled RCS on 20.    GBS done.  Baby active.  RTC 1 week.  Twice weekly BPPs to start tomorrow.

## 2020-02-05 NOTE — NURSING NOTE
"Chief Complaint   Patient presents with     Prenatal Care     37w 2d     GBS today     Initial /68 (BP Location: Left arm, Cuff Size: Adult Large)   Wt 122.9 kg (271 lb)   LMP 2019 (Exact Date)   BMI 45.10 kg/m   Estimated body mass index is 45.1 kg/m  as calculated from the following:    Height as of 19: 1.651 m (5' 5\").    Weight as of this encounter: 122.9 kg (271 lb).  BP completed using cuff size: large    Questioned patient about current smoking habits.  Pt. has never smoked.        Sahara Haddad, FLORI    "

## 2020-02-06 LAB
GP B STREP DNA SPEC QL NAA+PROBE: NEGATIVE
SPECIMEN SOURCE: NORMAL

## 2020-02-10 ENCOUNTER — PATIENT OUTREACH (OUTPATIENT)
Dept: EDUCATION SERVICES | Facility: CLINIC | Age: 25
End: 2020-02-10
Payer: COMMERCIAL

## 2020-02-10 DIAGNOSIS — O24.414 INSULIN CONTROLLED GESTATIONAL DIABETES MELLITUS (GDM) IN THIRD TRIMESTER: Primary | ICD-10-CM

## 2020-02-10 DIAGNOSIS — O24.419 GESTATIONAL DIABETES: ICD-10-CM

## 2020-02-10 RX ORDER — BLOOD SUGAR DIAGNOSTIC
STRIP MISCELLANEOUS
Qty: 150 EACH | Refills: 3 | Status: SHIPPED | OUTPATIENT
Start: 2020-02-10

## 2020-02-10 NOTE — PROGRESS NOTES
Gestational Diabetes Follow-up    Subjective/Objective:    Opal Rafy sent in blood glucose log for review. Last date of communication was: 2/3/20.    Gestational diabetes is being managed with diet, activity and medications    Taking diabetes medications:   yes:     Diabetes Medication(s)     Insulin       insulin isophane human (HUMULIN N PEN) 100 UNIT/ML injection    24 units before bed + 2 unit prime = 26 units daily          Estimated Date of Delivery: Feb 24, 2020    BG/Food Log:       I need more test strips I will be running out tomorrow, do I just pick them up at the pharmacy?? Also here are my tests.       Assessment:  Fasting blood sugars just at target, with 2 slightly elevated readings.  Recommend small insulin dose increase.     Ketones: negative.   Fasting blood glucoses: 82% in target.  After breakfast: 90% in target.  After lunch: 100% in target.  After dinner: 100% in target.    Plan/Response:  Recommend increase to insulin - Humulin 0-0-0-24 --> 0-0-0-26.  Updated test strip supply volume sent to pharmacy.  Follow up 3 days if not delivered.    Piper Vargas MS, RD, LD, CDE      Any diabetes medication dose changes were made via the CDE Protocol and Collaborative Practice Agreement with the patient's OB/GYN provider. A copy of this encounter was shared with the provider.    E-mail reply to patient:  Abdelrahman Joseph,    Thanks for the update.  You have 3 refills and should be able to call the pharmacy for a refill, similar to medications.  With your fasting readings right at or slightly above target, please increase to 26 units at bedtime.    Follow up in 3 days if you re not delivered.    We expect your blood sugars to go back to normal right after delivery, as once the placenta is no longer there, the hormones causing higher blood sugars are gone. After delivery, we do recommend to check blood sugars 4 times a week, once fasting and once 2 hours after each of your meals, just to keep tabs on  the numbers and be sure they are in goal. After delivery, blood sugar goals change a bit - fasting before breakfast goal is less than 100 and 2 hours after meals is less than 140. Your OB doctor should have you do a 2-hour oral glucose tolerance test at your 6 week postpartum visit just to be sure blood glucose is back to normal.      Piper Vargas, MS, RD, LD, CDE  Diabetes

## 2020-02-11 ENCOUNTER — PRENATAL OFFICE VISIT (OUTPATIENT)
Dept: OBGYN | Facility: CLINIC | Age: 25
End: 2020-02-11
Payer: COMMERCIAL

## 2020-02-11 VITALS — BODY MASS INDEX: 44.76 KG/M2 | SYSTOLIC BLOOD PRESSURE: 134 MMHG | WEIGHT: 269 LBS | DIASTOLIC BLOOD PRESSURE: 80 MMHG

## 2020-02-11 DIAGNOSIS — O24.419 GESTATIONAL DIABETES MELLITUS (GDM) IN THIRD TRIMESTER, GESTATIONAL DIABETES METHOD OF CONTROL UNSPECIFIED: ICD-10-CM

## 2020-02-11 DIAGNOSIS — Z98.891 S/P C-SECTION: ICD-10-CM

## 2020-02-11 DIAGNOSIS — Z34.83 ENCOUNTER FOR SUPERVISION OF OTHER NORMAL PREGNANCY IN THIRD TRIMESTER: Primary | ICD-10-CM

## 2020-02-11 PROCEDURE — 59425 ANTEPARTUM CARE ONLY: CPT | Performed by: OBSTETRICS & GYNECOLOGY

## 2020-02-11 PROCEDURE — 99207 ZZC PRENATAL VISIT: CPT | Performed by: OBSTETRICS & GYNECOLOGY

## 2020-02-11 NOTE — PROGRESS NOTES
25yo  at 38w1d with 2 prior C/S and GDM,A2.  Insulin increased yesterday to 26u @[facility].  Has RCS scheduled for .    Intermittent ctx last night and today.  Random.  RTC 1 week.  Cervix check after BPP if ctx increasing.

## 2020-02-12 ENCOUNTER — HOSPITAL ENCOUNTER (INPATIENT)
Facility: CLINIC | Age: 25
LOS: 3 days | Discharge: HOME | End: 2020-02-15
Attending: OBSTETRICS & GYNECOLOGY | Admitting: OBSTETRICS & GYNECOLOGY
Payer: COMMERCIAL

## 2020-02-12 ENCOUNTER — NURSE TRIAGE (OUTPATIENT)
Dept: NURSING | Facility: CLINIC | Age: 25
End: 2020-02-12

## 2020-02-12 ENCOUNTER — ANESTHESIA EVENT (OUTPATIENT)
Dept: OBGYN | Facility: CLINIC | Age: 25
End: 2020-02-12
Payer: COMMERCIAL

## 2020-02-12 ENCOUNTER — ANESTHESIA (OUTPATIENT)
Dept: OBGYN | Facility: CLINIC | Age: 25
End: 2020-02-12
Payer: COMMERCIAL

## 2020-02-12 DIAGNOSIS — Z98.891 S/P C-SECTION: Primary | ICD-10-CM

## 2020-02-12 DIAGNOSIS — Z98.891 PREVIOUS CESAREAN SECTION: ICD-10-CM

## 2020-02-12 PROBLEM — Z36.89 ENCOUNTER FOR TRIAGE IN PREGNANT PATIENT: Status: ACTIVE | Noted: 2020-02-12

## 2020-02-12 LAB
ABO + RH BLD: NORMAL
ABO + RH BLD: NORMAL
BLD GP AB SCN SERPL QL: NORMAL
BLOOD BANK CMNT PATIENT-IMP: NORMAL
GLUCOSE BLDC GLUCOMTR-MCNC: 106 MG/DL (ref 70–99)
GLUCOSE BLDC GLUCOMTR-MCNC: 121 MG/DL (ref 70–99)
GLUCOSE BLDC GLUCOMTR-MCNC: 124 MG/DL (ref 70–99)
GLUCOSE BLDC GLUCOMTR-MCNC: 141 MG/DL (ref 70–99)
HGB BLD-MCNC: 11.4 G/DL (ref 11.7–15.7)
PLATELET # BLD AUTO: 319 10E9/L (ref 150–450)
SPECIMEN EXP DATE BLD: NORMAL
T PALLIDUM AB SER QL: NONREACTIVE

## 2020-02-12 PROCEDURE — 37000009 ZZH ANESTHESIA TECHNICAL FEE, EACH ADDTL 15 MIN: Performed by: OBSTETRICS & GYNECOLOGY

## 2020-02-12 PROCEDURE — 36000058 ZZH SURGERY LEVEL 3 EA 15 ADDTL MIN: Performed by: OBSTETRICS & GYNECOLOGY

## 2020-02-12 PROCEDURE — 25000128 H RX IP 250 OP 636: Performed by: OBSTETRICS & GYNECOLOGY

## 2020-02-12 PROCEDURE — 71000013 ZZH RECOVERY PHASE 1 LEVEL 1 EA ADDTL HR: Performed by: OBSTETRICS & GYNECOLOGY

## 2020-02-12 PROCEDURE — 25800030 ZZH RX IP 258 OP 636: Performed by: OBSTETRICS & GYNECOLOGY

## 2020-02-12 PROCEDURE — 59515 CESAREAN DELIVERY: CPT | Performed by: OBSTETRICS & GYNECOLOGY

## 2020-02-12 PROCEDURE — 25000128 H RX IP 250 OP 636: Performed by: ANESTHESIOLOGY

## 2020-02-12 PROCEDURE — 27210794 ZZH OR GENERAL SUPPLY STERILE: Performed by: OBSTETRICS & GYNECOLOGY

## 2020-02-12 PROCEDURE — 12000000 ZZH R&B MED SURG/OB

## 2020-02-12 PROCEDURE — 25000128 H RX IP 250 OP 636: Performed by: NURSE ANESTHETIST, CERTIFIED REGISTERED

## 2020-02-12 PROCEDURE — G0463 HOSPITAL OUTPT CLINIC VISIT: HCPCS

## 2020-02-12 PROCEDURE — 00000146 ZZHCL STATISTIC GLUCOSE BY METER IP

## 2020-02-12 PROCEDURE — 37000008 ZZH ANESTHESIA TECHNICAL FEE, 1ST 30 MIN: Performed by: OBSTETRICS & GYNECOLOGY

## 2020-02-12 PROCEDURE — 71000012 ZZH RECOVERY PHASE 1 LEVEL 1 FIRST HR: Performed by: OBSTETRICS & GYNECOLOGY

## 2020-02-12 PROCEDURE — 86900 BLOOD TYPING SEROLOGIC ABO: CPT | Performed by: OBSTETRICS & GYNECOLOGY

## 2020-02-12 PROCEDURE — 86780 TREPONEMA PALLIDUM: CPT | Performed by: OBSTETRICS & GYNECOLOGY

## 2020-02-12 PROCEDURE — 86901 BLOOD TYPING SEROLOGIC RH(D): CPT | Performed by: OBSTETRICS & GYNECOLOGY

## 2020-02-12 PROCEDURE — 25000132 ZZH RX MED GY IP 250 OP 250 PS 637: Performed by: OBSTETRICS & GYNECOLOGY

## 2020-02-12 PROCEDURE — 25000125 ZZHC RX 250: Performed by: NURSE ANESTHETIST, CERTIFIED REGISTERED

## 2020-02-12 PROCEDURE — 25000125 ZZHC RX 250: Performed by: ANESTHESIOLOGY

## 2020-02-12 PROCEDURE — 86850 RBC ANTIBODY SCREEN: CPT | Performed by: OBSTETRICS & GYNECOLOGY

## 2020-02-12 PROCEDURE — 36000056 ZZH SURGERY LEVEL 3 1ST 30 MIN: Performed by: OBSTETRICS & GYNECOLOGY

## 2020-02-12 PROCEDURE — 85049 AUTOMATED PLATELET COUNT: CPT | Performed by: OBSTETRICS & GYNECOLOGY

## 2020-02-12 PROCEDURE — 25000125 ZZHC RX 250: Performed by: OBSTETRICS & GYNECOLOGY

## 2020-02-12 PROCEDURE — 85018 HEMOGLOBIN: CPT | Performed by: OBSTETRICS & GYNECOLOGY

## 2020-02-12 RX ORDER — ONDANSETRON 2 MG/ML
4 INJECTION INTRAMUSCULAR; INTRAVENOUS EVERY 30 MIN PRN
Status: DISCONTINUED | OUTPATIENT
Start: 2020-02-12 | End: 2020-02-12

## 2020-02-12 RX ORDER — PHENYLEPHRINE HYDROCHLORIDE 10 MG/ML
INJECTION INTRAVENOUS PRN
Status: DISCONTINUED | OUTPATIENT
Start: 2020-02-12 | End: 2020-02-12

## 2020-02-12 RX ORDER — HYDRALAZINE HYDROCHLORIDE 20 MG/ML
2.5-5 INJECTION INTRAMUSCULAR; INTRAVENOUS EVERY 10 MIN PRN
Status: DISCONTINUED | OUTPATIENT
Start: 2020-02-12 | End: 2020-02-12

## 2020-02-12 RX ORDER — ACETAMINOPHEN 325 MG/1
325 TABLET ORAL EVERY 4 HOURS PRN
Status: DISCONTINUED | OUTPATIENT
Start: 2020-02-12 | End: 2020-02-12

## 2020-02-12 RX ORDER — SODIUM CHLORIDE, SODIUM LACTATE, POTASSIUM CHLORIDE, CALCIUM CHLORIDE 600; 310; 30; 20 MG/100ML; MG/100ML; MG/100ML; MG/100ML
INJECTION, SOLUTION INTRAVENOUS CONTINUOUS
Status: DISCONTINUED | OUTPATIENT
Start: 2020-02-12 | End: 2020-02-12

## 2020-02-12 RX ORDER — OXYTOCIN 10 [USP'U]/ML
10 INJECTION, SOLUTION INTRAMUSCULAR; INTRAVENOUS
Status: DISCONTINUED | OUTPATIENT
Start: 2020-02-12 | End: 2020-02-15 | Stop reason: HOSPADM

## 2020-02-12 RX ORDER — NALOXONE HYDROCHLORIDE 0.4 MG/ML
.1-.4 INJECTION, SOLUTION INTRAMUSCULAR; INTRAVENOUS; SUBCUTANEOUS
Status: DISCONTINUED | OUTPATIENT
Start: 2020-02-12 | End: 2020-02-12

## 2020-02-12 RX ORDER — LIDOCAINE 40 MG/G
CREAM TOPICAL
Status: DISCONTINUED | OUTPATIENT
Start: 2020-02-12 | End: 2020-02-12

## 2020-02-12 RX ORDER — AMOXICILLIN 250 MG
2 CAPSULE ORAL 2 TIMES DAILY PRN
Status: DISCONTINUED | OUTPATIENT
Start: 2020-02-12 | End: 2020-02-15 | Stop reason: HOSPADM

## 2020-02-12 RX ORDER — PROCHLORPERAZINE 25 MG
25 SUPPOSITORY, RECTAL RECTAL EVERY 12 HOURS PRN
Status: DISCONTINUED | OUTPATIENT
Start: 2020-02-12 | End: 2020-02-15 | Stop reason: HOSPADM

## 2020-02-12 RX ORDER — EPHEDRINE SULFATE 50 MG/ML
INJECTION, SOLUTION INTRAVENOUS PRN
Status: DISCONTINUED | OUTPATIENT
Start: 2020-02-12 | End: 2020-02-12

## 2020-02-12 RX ORDER — NICOTINE POLACRILEX 4 MG
15-30 LOZENGE BUCCAL
Status: DISCONTINUED | OUTPATIENT
Start: 2020-02-12 | End: 2020-02-15 | Stop reason: HOSPADM

## 2020-02-12 RX ORDER — HYDROMORPHONE HYDROCHLORIDE 1 MG/ML
.3-.5 INJECTION, SOLUTION INTRAMUSCULAR; INTRAVENOUS; SUBCUTANEOUS EVERY 5 MIN PRN
Status: DISCONTINUED | OUTPATIENT
Start: 2020-02-12 | End: 2020-02-12

## 2020-02-12 RX ORDER — OXYTOCIN/0.9 % SODIUM CHLORIDE 30/500 ML
340 PLASTIC BAG, INJECTION (ML) INTRAVENOUS CONTINUOUS PRN
Status: DISCONTINUED | OUTPATIENT
Start: 2020-02-12 | End: 2020-02-15 | Stop reason: HOSPADM

## 2020-02-12 RX ORDER — HYDROCORTISONE 2.5 %
CREAM (GRAM) TOPICAL 3 TIMES DAILY PRN
Status: DISCONTINUED | OUTPATIENT
Start: 2020-02-12 | End: 2020-02-15 | Stop reason: HOSPADM

## 2020-02-12 RX ORDER — DIPHENHYDRAMINE HCL 25 MG
25 CAPSULE ORAL EVERY 6 HOURS PRN
Status: DISCONTINUED | OUTPATIENT
Start: 2020-02-12 | End: 2020-02-15 | Stop reason: HOSPADM

## 2020-02-12 RX ORDER — IBUPROFEN 800 MG/1
800 TABLET, FILM COATED ORAL EVERY 6 HOURS PRN
Status: DISCONTINUED | OUTPATIENT
Start: 2020-02-13 | End: 2020-02-15 | Stop reason: HOSPADM

## 2020-02-12 RX ORDER — OXYTOCIN/0.9 % SODIUM CHLORIDE 30/500 ML
100 PLASTIC BAG, INJECTION (ML) INTRAVENOUS CONTINUOUS
Status: DISCONTINUED | OUTPATIENT
Start: 2020-02-12 | End: 2020-02-15 | Stop reason: HOSPADM

## 2020-02-12 RX ORDER — DIPHENHYDRAMINE HYDROCHLORIDE 50 MG/ML
25 INJECTION INTRAMUSCULAR; INTRAVENOUS EVERY 6 HOURS PRN
Status: DISCONTINUED | OUTPATIENT
Start: 2020-02-12 | End: 2020-02-15 | Stop reason: HOSPADM

## 2020-02-12 RX ORDER — GLYCOPYRROLATE 0.2 MG/ML
INJECTION, SOLUTION INTRAMUSCULAR; INTRAVENOUS PRN
Status: DISCONTINUED | OUTPATIENT
Start: 2020-02-12 | End: 2020-02-12

## 2020-02-12 RX ORDER — METOCLOPRAMIDE HYDROCHLORIDE 5 MG/ML
10 INJECTION INTRAMUSCULAR; INTRAVENOUS EVERY 6 HOURS PRN
Status: DISCONTINUED | OUTPATIENT
Start: 2020-02-12 | End: 2020-02-15 | Stop reason: HOSPADM

## 2020-02-12 RX ORDER — DIMENHYDRINATE 50 MG/ML
25 INJECTION, SOLUTION INTRAMUSCULAR; INTRAVENOUS
Status: DISCONTINUED | OUTPATIENT
Start: 2020-02-12 | End: 2020-02-12

## 2020-02-12 RX ORDER — ONDANSETRON 4 MG/1
4 TABLET, ORALLY DISINTEGRATING ORAL EVERY 30 MIN PRN
Status: DISCONTINUED | OUTPATIENT
Start: 2020-02-12 | End: 2020-02-12

## 2020-02-12 RX ORDER — BUPIVACAINE HYDROCHLORIDE 7.5 MG/ML
INJECTION, SOLUTION INTRASPINAL PRN
Status: DISCONTINUED | OUTPATIENT
Start: 2020-02-12 | End: 2020-02-12

## 2020-02-12 RX ORDER — EPHEDRINE SULFATE 50 MG/ML
5 INJECTION, SOLUTION INTRAMUSCULAR; INTRAVENOUS; SUBCUTANEOUS
Status: DISCONTINUED | OUTPATIENT
Start: 2020-02-12 | End: 2020-02-12

## 2020-02-12 RX ORDER — SCOLOPAMINE TRANSDERMAL SYSTEM 1 MG/1
1 PATCH, EXTENDED RELEASE TRANSDERMAL ONCE
Status: COMPLETED | OUTPATIENT
Start: 2020-02-12 | End: 2020-02-13

## 2020-02-12 RX ORDER — ONDANSETRON 2 MG/ML
4 INJECTION INTRAMUSCULAR; INTRAVENOUS EVERY 6 HOURS PRN
Status: DISCONTINUED | OUTPATIENT
Start: 2020-02-12 | End: 2020-02-15 | Stop reason: HOSPADM

## 2020-02-12 RX ORDER — MISOPROSTOL 200 UG/1
800 TABLET ORAL
Status: DISCONTINUED | OUTPATIENT
Start: 2020-02-12 | End: 2020-02-15 | Stop reason: HOSPADM

## 2020-02-12 RX ORDER — BISACODYL 10 MG
10 SUPPOSITORY, RECTAL RECTAL DAILY PRN
Status: DISCONTINUED | OUTPATIENT
Start: 2020-02-14 | End: 2020-02-15 | Stop reason: HOSPADM

## 2020-02-12 RX ORDER — LANOLIN 100 %
OINTMENT (GRAM) TOPICAL
Status: DISCONTINUED | OUTPATIENT
Start: 2020-02-12 | End: 2020-02-15 | Stop reason: HOSPADM

## 2020-02-12 RX ORDER — AMOXICILLIN 250 MG
1 CAPSULE ORAL 2 TIMES DAILY PRN
Status: DISCONTINUED | OUTPATIENT
Start: 2020-02-12 | End: 2020-02-15 | Stop reason: HOSPADM

## 2020-02-12 RX ORDER — FENTANYL CITRATE 50 UG/ML
25-50 INJECTION, SOLUTION INTRAMUSCULAR; INTRAVENOUS
Status: DISCONTINUED | OUTPATIENT
Start: 2020-02-12 | End: 2020-02-12

## 2020-02-12 RX ORDER — DEXAMETHASONE SODIUM PHOSPHATE 4 MG/ML
INJECTION, SOLUTION INTRA-ARTICULAR; INTRALESIONAL; INTRAMUSCULAR; INTRAVENOUS; SOFT TISSUE PRN
Status: DISCONTINUED | OUTPATIENT
Start: 2020-02-12 | End: 2020-02-12

## 2020-02-12 RX ORDER — LABETALOL 20 MG/4 ML (5 MG/ML) INTRAVENOUS SYRINGE
10
Status: DISCONTINUED | OUTPATIENT
Start: 2020-02-12 | End: 2020-02-12

## 2020-02-12 RX ORDER — DEXTROSE MONOHYDRATE 25 G/50ML
25-50 INJECTION, SOLUTION INTRAVENOUS
Status: DISCONTINUED | OUTPATIENT
Start: 2020-02-12 | End: 2020-02-15 | Stop reason: HOSPADM

## 2020-02-12 RX ORDER — ONDANSETRON 4 MG/1
4 TABLET, ORALLY DISINTEGRATING ORAL EVERY 6 HOURS PRN
Status: DISCONTINUED | OUTPATIENT
Start: 2020-02-12 | End: 2020-02-12

## 2020-02-12 RX ORDER — CEFAZOLIN SODIUM 1 G/3ML
1 INJECTION, POWDER, FOR SOLUTION INTRAMUSCULAR; INTRAVENOUS SEE ADMIN INSTRUCTIONS
Status: DISCONTINUED | OUTPATIENT
Start: 2020-02-12 | End: 2020-02-12

## 2020-02-12 RX ORDER — DEXTROSE, SODIUM CHLORIDE, SODIUM LACTATE, POTASSIUM CHLORIDE, AND CALCIUM CHLORIDE 5; .6; .31; .03; .02 G/100ML; G/100ML; G/100ML; G/100ML; G/100ML
INJECTION, SOLUTION INTRAVENOUS CONTINUOUS
Status: DISCONTINUED | OUTPATIENT
Start: 2020-02-12 | End: 2020-02-15 | Stop reason: HOSPADM

## 2020-02-12 RX ORDER — OXYTOCIN/0.9 % SODIUM CHLORIDE 30/500 ML
PLASTIC BAG, INJECTION (ML) INTRAVENOUS PRN
Status: DISCONTINUED | OUTPATIENT
Start: 2020-02-12 | End: 2020-02-12

## 2020-02-12 RX ORDER — MORPHINE SULFATE 1 MG/ML
INJECTION, SOLUTION EPIDURAL; INTRATHECAL; INTRAVENOUS PRN
Status: DISCONTINUED | OUTPATIENT
Start: 2020-02-12 | End: 2020-02-12

## 2020-02-12 RX ORDER — NALBUPHINE HYDROCHLORIDE 20 MG/ML
2.5-5 INJECTION, SOLUTION INTRAMUSCULAR; INTRAVENOUS; SUBCUTANEOUS EVERY 6 HOURS PRN
Status: DISCONTINUED | OUTPATIENT
Start: 2020-02-12 | End: 2020-02-12

## 2020-02-12 RX ORDER — MORPHINE SULFATE 0.5 MG/ML
0.2 INJECTION, SOLUTION EPIDURAL; INTRATHECAL; INTRAVENOUS ONCE
Status: DISCONTINUED | OUTPATIENT
Start: 2020-02-12 | End: 2020-02-12

## 2020-02-12 RX ORDER — CITRIC ACID/SODIUM CITRATE 334-500MG
30 SOLUTION, ORAL ORAL
Status: COMPLETED | OUTPATIENT
Start: 2020-02-12 | End: 2020-02-12

## 2020-02-12 RX ORDER — SIMETHICONE 80 MG
80 TABLET,CHEWABLE ORAL 4 TIMES DAILY PRN
Status: DISCONTINUED | OUTPATIENT
Start: 2020-02-12 | End: 2020-02-15 | Stop reason: HOSPADM

## 2020-02-12 RX ORDER — LIDOCAINE 40 MG/G
CREAM TOPICAL
Status: DISCONTINUED | OUTPATIENT
Start: 2020-02-12 | End: 2020-02-15 | Stop reason: HOSPADM

## 2020-02-12 RX ORDER — ONDANSETRON 2 MG/ML
4 INJECTION INTRAMUSCULAR; INTRAVENOUS EVERY 6 HOURS PRN
Status: DISCONTINUED | OUTPATIENT
Start: 2020-02-12 | End: 2020-02-12

## 2020-02-12 RX ORDER — ACETAMINOPHEN 325 MG/1
975 TABLET ORAL EVERY 8 HOURS PRN
Status: DISCONTINUED | OUTPATIENT
Start: 2020-02-12 | End: 2020-02-15 | Stop reason: HOSPADM

## 2020-02-12 RX ORDER — NALOXONE HYDROCHLORIDE 0.4 MG/ML
.1-.4 INJECTION, SOLUTION INTRAMUSCULAR; INTRAVENOUS; SUBCUTANEOUS
Status: DISCONTINUED | OUTPATIENT
Start: 2020-02-12 | End: 2020-02-15 | Stop reason: HOSPADM

## 2020-02-12 RX ORDER — CEFAZOLIN SODIUM IN 0.9 % NACL 3 G/100 ML
3 INTRAVENOUS SOLUTION, PIGGYBACK (ML) INTRAVENOUS
Status: COMPLETED | OUTPATIENT
Start: 2020-02-12 | End: 2020-02-12

## 2020-02-12 RX ORDER — KETOROLAC TROMETHAMINE 30 MG/ML
30 INJECTION, SOLUTION INTRAMUSCULAR; INTRAVENOUS EVERY 6 HOURS
Status: COMPLETED | OUTPATIENT
Start: 2020-02-12 | End: 2020-02-13

## 2020-02-12 RX ADMIN — MORPHINE SULFATE 0.2 MG: 1 INJECTION EPIDURAL; INTRATHECAL; INTRAVENOUS at 03:32

## 2020-02-12 RX ADMIN — DEXAMETHASONE SODIUM PHOSPHATE 4 MG: 4 INJECTION, SOLUTION INTRA-ARTICULAR; INTRALESIONAL; INTRAMUSCULAR; INTRAVENOUS; SOFT TISSUE at 03:34

## 2020-02-12 RX ADMIN — KETOROLAC TROMETHAMINE 30 MG: 30 INJECTION, SOLUTION INTRAMUSCULAR at 18:10

## 2020-02-12 RX ADMIN — EPHEDRINE SULFATE 10 MG: 50 INJECTION, SOLUTION INTRAVENOUS at 03:51

## 2020-02-12 RX ADMIN — PHENYLEPHRINE HYDROCHLORIDE 100 MCG: 10 INJECTION INTRAVENOUS at 03:44

## 2020-02-12 RX ADMIN — ACETAMINOPHEN 975 MG: 325 TABLET, FILM COATED ORAL at 15:47

## 2020-02-12 RX ADMIN — BUPIVACAINE HYDROCHLORIDE IN DEXTROSE 15 MG: 7.5 INJECTION, SOLUTION SUBARACHNOID at 03:32

## 2020-02-12 RX ADMIN — ONDANSETRON HYDROCHLORIDE 4 MG: 2 INJECTION, SOLUTION INTRAMUSCULAR; INTRAVENOUS at 08:42

## 2020-02-12 RX ADMIN — GLYCOPYRROLATE 0.2 MG: 0.2 INJECTION, SOLUTION INTRAMUSCULAR; INTRAVENOUS at 03:34

## 2020-02-12 RX ADMIN — SODIUM CITRATE AND CITRIC ACID MONOHYDRATE 30 ML: 500; 334 SOLUTION ORAL at 03:00

## 2020-02-12 RX ADMIN — KETOROLAC TROMETHAMINE 30 MG: 30 INJECTION, SOLUTION INTRAMUSCULAR at 12:23

## 2020-02-12 RX ADMIN — PHENYLEPHRINE HYDROCHLORIDE 150 MCG: 10 INJECTION INTRAVENOUS at 03:56

## 2020-02-12 RX ADMIN — EPHEDRINE SULFATE 5 MG: 50 INJECTION, SOLUTION INTRAVENOUS at 03:56

## 2020-02-12 RX ADMIN — SODIUM CHLORIDE, POTASSIUM CHLORIDE, SODIUM LACTATE AND CALCIUM CHLORIDE: 600; 310; 30; 20 INJECTION, SOLUTION INTRAVENOUS at 03:42

## 2020-02-12 RX ADMIN — SODIUM CHLORIDE, POTASSIUM CHLORIDE, SODIUM LACTATE AND CALCIUM CHLORIDE 1000 ML: 600; 310; 30; 20 INJECTION, SOLUTION INTRAVENOUS at 02:20

## 2020-02-12 RX ADMIN — PHENYLEPHRINE HYDROCHLORIDE 100 MCG: 10 INJECTION INTRAVENOUS at 03:38

## 2020-02-12 RX ADMIN — PHENYLEPHRINE HYDROCHLORIDE 100 MCG: 10 INJECTION INTRAVENOUS at 03:51

## 2020-02-12 RX ADMIN — KETOROLAC TROMETHAMINE 30 MG: 30 INJECTION, SOLUTION INTRAMUSCULAR at 06:16

## 2020-02-12 RX ADMIN — SODIUM CHLORIDE, SODIUM LACTATE, POTASSIUM CHLORIDE, CALCIUM CHLORIDE AND DEXTROSE MONOHYDRATE: 5; 600; 310; 30; 20 INJECTION, SOLUTION INTRAVENOUS at 11:07

## 2020-02-12 RX ADMIN — OXYTOCIN-SODIUM CHLORIDE 0.9% IV SOLN 30 UNIT/500ML 500 ML: 30-0.9/5 SOLUTION at 03:49

## 2020-02-12 RX ADMIN — ONDANSETRON 4 MG: 2 INJECTION INTRAMUSCULAR; INTRAVENOUS at 03:34

## 2020-02-12 RX ADMIN — Medication 100 ML/HR: at 06:17

## 2020-02-12 RX ADMIN — Medication 3 G: at 03:32

## 2020-02-12 RX ADMIN — SCOPALAMINE 1 PATCH: 1 PATCH, EXTENDED RELEASE TRANSDERMAL at 08:42

## 2020-02-12 RX ADMIN — EPHEDRINE SULFATE 5 MG: 50 INJECTION, SOLUTION INTRAVENOUS at 03:44

## 2020-02-12 ASSESSMENT — ACTIVITIES OF DAILY LIVING (ADL)
RETIRED_COMMUNICATION: 0-->UNDERSTANDS/COMMUNICATES WITHOUT DIFFICULTY
DRESS: 0-->INDEPENDENT
AMBULATION: 0-->INDEPENDENT
BATHING: 0-->INDEPENDENT
COGNITION: 0 - NO COGNITION ISSUES REPORTED
FALL_HISTORY_WITHIN_LAST_SIX_MONTHS: NO
TRANSFERRING: 0-->INDEPENDENT
TOILETING: 0-->INDEPENDENT
RETIRED_EATING: 0-->INDEPENDENT
SWALLOWING: 0-->SWALLOWS FOODS/LIQUIDS WITHOUT DIFFICULTY

## 2020-02-12 ASSESSMENT — ENCOUNTER SYMPTOMS
DYSRHYTHMIAS: 0
STRIDOR: 0
SEIZURES: 0

## 2020-02-12 ASSESSMENT — COPD QUESTIONNAIRES: COPD: 0

## 2020-02-12 NOTE — ANESTHESIA POSTPROCEDURE EVALUATION
Patient: Opal Hillman    Procedure(s):  REPEAT  SECTION    Diagnosis:Previous  section [Z98.891]  Diagnosis Additional Information: No value filed.    Anesthesia Type:  Spinal    Note:  Anesthesia Post Evaluation    Patient location during evaluation: Bedside  Patient participation: Able to participate in evaluation but full recovery from regional anesthesia has not yet ocurrred but is anticipated to occur within 48 hours  Level of consciousness: awake  Pain management: adequate  Airway patency: patent  Cardiovascular status: acceptable  Respiratory status: acceptable  Hydration status: acceptable  PONV: controlled     Anesthetic complications: None          Last vitals:  Vitals:    20 0130 20 0140 20 0427   BP:  123/74 104/57   Resp: 16     Temp: 98.3  F (36.8  C)  97.7  F (36.5  C)   SpO2:   100%         Electronically Signed By: Bronson Thomas MD  2020  4:29 AM

## 2020-02-12 NOTE — PLAN OF CARE
Data: [facility].  Reason for maternal/fetal assessment is uterine contractions. Patient reports contractions started yesterday around 1030 am, but got better during the day.  They picked up again around 2100.  Patient is a .  Prenatal record reviewed. Pregnancy  has been complicated by gestational diabetes, insulin controlled.  Gestational Age 38w2d. VSS. Fetal movement present. Patient denies leaking of vaginal fluid/rupture of membranes, vaginal bleeding, pelvic pressure, nausea, vomiting, headache, visual disturbances, epigastric or URQ pain, significant edema. Support person is present.   Action: Verbal consent for EFM. Triage assessment completed. Bill of rights reviewed.  Response: Patient verbalized agreement with plan. Will contact Dr Ana Jones with update and further orders.

## 2020-02-12 NOTE — ANESTHESIA CARE TRANSFER NOTE
Patient: Opal Hillman    Procedure(s):  REPEAT  SECTION    Diagnosis: Previous  section [Z98.891]  Diagnosis Additional Information: No value filed.    Anesthesia Type:   Spinal     Note:  Airway :Room Air  Patient transferred to:Labor and Delivery  Handoff Report: Identifed the Patient, Identified the Reponsible Provider, Reviewed the pertinent medical history, Discussed the surgical course, Reviewed Intra-OP anesthesia mangement and issues during anesthesia, Set expectations for post-procedure period and Allowed opportunity for questions and acknowledgement of understanding      Vitals: (Last set prior to Anesthesia Care Transfer)    CRNA VITALS  2020 0351 - 2020 0428      2020             Pulse:  76    SpO2:  98 %                Electronically Signed By: LEOPOLDO Wyatt CRNA  2020  4:28 AM

## 2020-02-12 NOTE — ANESTHESIA PROCEDURE NOTES
Peripheral nerve/Neuraxial procedure note : intrathecal  Pre-Procedure  Performed by Bronson Thomas MD  Referred by mike  Location: OB      Pre-Anesthestic Checklist: patient identified, IV checked, site marked, risks and benefits discussed, informed consent, monitors and equipment checked, pre-op evaluation and at physician/surgeon's request    Timeout  Correct Patient: Yes   Correct Procedure: Yes   Correct Site: Yes   Correct Laterality: Yes and N/A   Correct Position: Yes   Site Marked: Yes, N/A   .   Procedure Documentation  ASA 3  .    Procedure: intrathecal, .   Patient Position:sitting Insertion Site:L3-4  (midline approach)     Patient Prep/Sterile Barriers; mask, sterile gloves, povidone-iodine 7.5% surgical scrub, patient draped.  .  Needle:  Spinal Needle (gauge): 22  # of attempts: 1 and # of redirects:  .        Assessment/Narrative  Paresthesias: No.  .  .  clear CSF fluid removed . Time Injected: 03:32

## 2020-02-12 NOTE — H&P
[facility]    History and Physical  Obstetrics and Gynecology     Date of Admission:  2020    Assessment & Plan   Opal Hillman is a 24 year old female who presents with labor, prior  times 2, GDMA2 on insulin.    ASSESSMENT:     IUP @ 38w2d in early labor.  NST reactive.  Category  I  GDMA2  Prior  times 2, planning RCS    PLAN:   Admit - see IP orders  Prepare for  section  Risks, benefits, and alternatives to  discussed in detail and she consents to proceed.  Given BMI, plan postoperative Lovenox to start later this evening.  Will monitor postpartum blood sugars but will not plan to continue any insulin at this time postpartum. Will need 2 hr OGTT at/after her 6 week postpartum visit.    Ana Jones MD    History of Present Illness   Opal Hillman is a 24 year old female  38w2d  Estimated Date of Delivery: 20 is calculated from Patient's last menstrual period was 2019 (exact date). [facility].    PRENATAL COURSE  Prenatal course was complicated by GDMA2, insulin dependent.      Recent Labs   Lab Test 19  0927   ABO A   RH Pos   AS Neg     Rhogam not indicated   Recent Labs   Lab Test 20  1515 19  0926   HEPBANG  --  Nonreactive   HIAGAB  --  Nonreactive   GBS Negative  --    RUQIGG  --  131       Past Medical History    I have reviewed this patient's medical history and updated it with pertinent information if needed.   Past Medical History:   Diagnosis Date     Hypertension in pregnancy     in first pregnancy     NO ACTIVE PROBLEMS        Past Surgical History   I have reviewed this patient's surgical history and updated it with pertinent information if needed.  Past Surgical History:   Procedure Laterality Date      SECTION  2016      SECTION N/A 2018    Procedure:  SECTION;  Repeat  Section;  Surgeon: Frankie Aragon MD;   Location: [facility]       Prior to Admission Medications   Prior to Admission Medications   Prescriptions Last Dose Informant Patient Reported? Taking?   ACCU-CHEK GUIDE test strip 2020 at 2345  No Yes   Sig: Use to test blood sugar 4 times daily or as directed.   Prenatal Vit-Fe Fumarate-FA (PRENATAL MULTIVITAMIN PLUS IRON) 27-0.8 MG TABS per tablet Past Week at Unknown time  Yes Yes   Sig: Take 1 tablet by mouth daily   acetone urine (KETOSTIX) test strip Past Week at Unknown time  No Yes   Sig: Test once daily   blood glucose monitoring (ACCU-CHEK FASTCLIX) lancets 2020 at 2345  No Yes   Si each 4 times daily   cholecalciferol (VITAMIN D3) 1000 UNIT tablet Past Week at Unknown time  Yes Yes   Sig: Take by mouth daily   insulin isophane human (HUMULIN N PEN) 100 UNIT/ML injection 2020 at 2345  No Yes   Si units before bed + 2 unit prime = 28 units daily   insulin pen needle (ULTICARE MINI) 31G X 6 MM miscellaneous 2020 at 2345  No Yes   Sig: Use 1 pen needles daily or as directed.      Facility-Administered Medications: None     Allergies   No Known Allergies    Social History   I have reviewed this patient's social history and updated it with pertinent information if needed. Opal Hillman  reports that she has never smoked. She has never used smokeless tobacco. She reports that she does not drink alcohol or use drugs.    Family History   I have reviewed this patient's family history and updated it with pertinent information if needed.   Family History   Problem Relation Age of Onset     Diabetes Father      Thyroid Disease Sister      Thyroid Disease Sister      Thyroid Disease Sister        Immunization History   No immunizations have been given to this patient during pregnancy    Physical Exam   Temp: 98.3  F (36.8  C) Temp src: Oral BP: 123/74     Resp: 16        Vital Signs with Ranges  Temp:  [98.3  F (36.8  C)] 98.3  F (36.8  C)  Resp:  [16] 16  BP: (123-134)/(74-80)  123/74    Abdomen: gravid, single vertex fetus, non-tender, EFW 8 lbs 8  Fetal Heart Tones: normal baseline, moderate variablility, + accels, no decels and Category I  TOCO:   frequency q 3-6 minutes  Constitutional: healthy, alert and active   Respiratory: no increased work of breathing  Cardiovascular: Normal apical impulse, regular rate and rhythm, normal S1 and S2, no S3 or S4, and no murmur noted  Skin/Extremites: no bruising or bleeding and normal skin color, texture, turgor  Neurologic: Awake, alert, oriented to name, place and time.  Cranial nerves II-XII are grossly intact.  Motor is 5 out of 5 bilaterally.  Cerebellar finger to nose, heel to shin intact.  Sensory is intact.  Babinski down going, Romberg negative, and gait is normal.  Neuropsychiatric: Level of consciousness: alert / normal  Affect: normal, pleasant and full

## 2020-02-12 NOTE — OP NOTE
PREOPERATIVE DIAGNOSES:      1.  Prior  x 2,  desiring repeat.    2.  Intrauterine pregnancy at 38w2d gestation.    3.  Laboring  4.  GDMA2, on insulin      POSTOPERATIVE DIAGNOSES:    1.  Same, plus  2.  Liveborn male infant, APGARs 9 and 9 at 1 and 5 minutes, weight 8 lb 1 oz    PROCEDURE:  Repeat  delivery.        SURGEON:  Ana Jones MD        FINDINGS:      1.  Liveborn infant in the ROT presentation.        PROCEDURE NOTE:  The patient was consented for repeat  delivery and was taken to the operating room where spinal anesthetic was administered.  She was prepped and draped in the dorsal supine position with a left lateral tilt.  After assuring adequate anesthesia and after a time out was performed, Pfannenstiel incision was made at the site of previous incision.  Dissection was carried with electrocautery to the fascia which was also opened with both sharp dissection and electrocautery.  The myofascial plane was developed in a cephalad and caudad directions.  The recti muscles were gently split in the midline.  The peritoneal cavity entered bluntly without difficulty.  No significant intra-abdominal adhesions were noted.  An Shaq retractor was placed. A bladder flap was created with sharp and blunt dissection. A low transverse hysterotomy was made and membranes ruptured bluntly.  Fluid was clear.  The uterine incision was extended digitally.  The fetal head was gently elevated and flexed and gently delivered through the incision.  The remainder of the infant followed without any difficulty.  Infant was immediately vigorous and did cry spontaneously.  The mouth and nares were suctioned.  After a 60 second delay the cord was doubly clamped and cut and the baby was taken to the warmer.  The placenta delivered spontaneously, was inspected and found to be intact with a 3-vessel cord.  The uterus was cleared of all clots and debris.  The uterine incision was inspected and no extensions  were noted. The uterus was closed with a running locking stitch of 1 Vicryl, and then a second imbricating layer of 0 Monocryl suture was used at the left apex, where some bleeding occurred. Both adnexae were visualized and tubes and ovaries appeared normal bilaterally.  The uterine incision was inspected and found to be hemostatic. The Shaq retractor was removed at this point.  Sponge and instrument counts were reported to me as correct.  Subfascial space was inspected and bleeding vessels were cauterized.  The fascia was reapproximated with a running looped 0 PDS suture.  The subcutaneous tissues were reapproximated with a running suture of 3-0 plain gut.  The skin was reapproximated with a running subcuticular stitch of 3-0 Monocryl.  Steri-Strips were placed across the incision.        She tolerated the procedure well and there were no complications noted.  Quantitative blood loss for the procedure was 271 mL.  Maternal blood type is Rh positive and therefore RhoGAM was not given.        The patient was returned to the PACU in good condition.  Urine output was adequate and clear throughout the procedure.            MIKAEL WALL MD

## 2020-02-12 NOTE — PROVIDER NOTIFICATION
20 0150   Provider Notification   Provider Name/Title Dr. Jones   Method of Notification Phone   Request Evaluate - Remote   Notification Reason Patient Arrived     MD updated on patient arrival.  She is a  at 38 2/7ths weeks.  She is an insulin dependent gestational diabetic that is scheduled for a repeat  section on 2020.  The patient states that she started anya yesterday at 1030 am and that they got better throughout the day but have recently increased in intensity and are getting closer together. She denies any bleeding or leaking of fluid and she is still feeling baby move.  FHT: 140, moderate variability, with variable decelerations and no accelerations yet.  Patient is anya every 7 minutes and is rating them an 8/10.  Orders to start an IV and bolus with fluids.  Patient is not to eat or drink anything.  Will continue to monitor.

## 2020-02-12 NOTE — PROVIDER NOTIFICATION
20 0225   Provider Notification   Provider Name/Title Dr. Jones   Method of Notification At Bedside   Notification Reason Status Update     MD at bedside discussing plan of care with patient.  Patient states that the contractions are increasing in intensity.  MD explained to the patient that she is okay with doing the  section now because of her gestational age and the gestational diabetes as well as the contractions not improving.  Patient is agreeable with the plan to move forward with the  section.

## 2020-02-12 NOTE — ANESTHESIA PREPROCEDURE EVALUATION
Anesthesia Pre-Procedure Evaluation    Patient: Opal Hillman   MRN: [id number] : 1995          Preoperative Diagnosis: Previous  section [Z98.891]    Procedure(s):  REPEAT  SECTION    Past Medical History:   Diagnosis Date     Hypertension in pregnancy     in first pregnancy     NO ACTIVE PROBLEMS      Past Surgical History:   Procedure Laterality Date      SECTION  2016      SECTION N/A 2018    Procedure:  SECTION;  Repeat  Section;  Surgeon: Frankie Aragon MD;  Location: [facility]     Anesthesia Evaluation     . Pt has had prior anesthetic. Type: Regional    History of anesthetic complications   - PONV        ROS/MED HX    ENT/Pulmonary:     (+)AURELIO risk factors obese, , . .   (-) asthma, COPD and recent URI   Neurologic:  - neg neurologic ROS    (-) seizures and CVA   Cardiovascular:        (-) hypertension, CAD, arrhythmias and valvular problems/murmurs   METS/Exercise Tolerance:     Hematologic:  - neg hematologic  ROS       Musculoskeletal:  - neg musculoskeletal ROS       GI/Hepatic:  - neg GI/hepatic ROS      (-) GERD   Renal/Genitourinary:  - ROS Renal section negative       Endo:     (+) type II DM Using insulin - not using insulin pump Normal glucose range: GDM Obesity, .   (-) Type I DM, thyroid disease and chronic steroid usage   Psychiatric:  - neg psychiatric ROS       Infectious Disease:  - neg infectious disease ROS       Malignancy:      - no malignancy   Other:    (+) Possibly pregnant                         Physical Exam  Normal systems: cardiovascular, pulmonary and dental    Airway   Mallampati: II  TM distance: >3 FB  Neck ROM: full    Dental     Cardiovascular   Rhythm and rate: regular and normal  (-) no friction rub, no systolic click and no murmur    Pulmonary    breath sounds clear to auscultation(-) no rhonchi, no decreased breath sounds, no wheezes, no rales and no stridor            Lab Results   Component Value  "Date    WBC 8.2 12/12/2019    HGB 11.7 12/12/2019    HCT 35.6 12/12/2019     12/12/2019    CR 0.46 (L) 05/06/2018    ALT 30 05/06/2018    AST 19 05/06/2018       Preop Vitals  BP Readings from Last 3 Encounters:   02/12/20 123/74   02/11/20 134/80   02/05/20 116/68    Pulse Readings from Last 3 Encounters:   01/10/20 89   07/22/19 64   05/14/18 78      Resp Readings from Last 3 Encounters:   02/12/20 16   05/14/18 16   05/05/18 18    SpO2 Readings from Last 3 Encounters:   05/12/18 98%   06/13/14 99%      Temp Readings from Last 1 Encounters:   02/12/20 98.3  F (36.8  C) (Oral)    Ht Readings from Last 1 Encounters:   07/22/19 1.651 m (5' 5\")      Wt Readings from Last 1 Encounters:   02/11/20 122 kg (269 lb)    Estimated body mass index is 44.76 kg/m  as calculated from the following:    Height as of 7/22/19: 1.651 m (5' 5\").    Weight as of 2/11/20: 122 kg (269 lb).       Anesthesia Plan      History & Physical Review  History and physical reviewed and following examination; no interval change.    ASA Status:  3 .    NPO Status:  > 4 hours    Plan for Spinal   PONV prophylaxis:  Ondansetron (or other 5HT-3) and Dexamethasone or Solumedrol       Postoperative Care  Postoperative pain management:  IV analgesics and Neuraxial analgesia.      Consents  Anesthetic plan, risks, benefits and alternatives discussed with:  Patient..                 Bronson Thomas MD                    .  "

## 2020-02-12 NOTE — PLAN OF CARE
Transferred to room 422. Patient complaining of nausea and has been vomiting, scopolamine patch and zofran given with good effect, patient now eating and tolerating fluids and diet.BS active, not passing flatus.  Incision dressing intact , moist drainage , no change in drainage since transfer. Flaherty catheter patent and draining. Catheter care done. VSS. Patient trying to rest in between feeds. Pre lunch . IV pitocin completed and IV maintenance fluids infusing. Denies pain , toradol given as ordered. Tylenol held earlier due to nausea, . Orientated to room. Family present this shift  12.30 patient states her legs feel heavy and numb and not able to bear weight at this time.Have talked to patient that she will need to try by 12 hours post op to sit at side of bed, , perineal care done in bed.  .Patients mobililty level scored using the bedside mobility assistance tool (BMAT). Patient is at a mobility level test number: 1. Mobility equipment used: [employer]. Required assist of 3 staff members. Further use of BMAT scoring required.

## 2020-02-12 NOTE — TELEPHONE ENCOUNTER
"23 y/o female 38w2d calls about onset of contractions that have increased throughout the day, she is at <10 minutes for over an hour, no vaginal bleeding, no rupture of membranes, plans to deliver at [facility], [facility]. Planned  2020 - RN advised her she will need evaluation in L&D per protocols.       RN placed call to L&D at [facility] [phone] and spoke with Nurse \"Nika\" to give report of call, patient advised to leave now to be evaluated.  will drive her.      Lorraine Ball RN - [place] Nurse Advisor  2020  12:57AM    Reason for Disposition    [1] History of prior delivery (multipara) AND [2] contractions < 10 minutes apart AND [3] present 1 hour    Additional Information    Negative: Passed out (i.e., lost consciousness, collapsed and was not responding)    Negative: Shock suspected (e.g., cold/pale/clammy skin, too weak to stand, low BP, rapid pulse)    Negative: Difficult to awaken or acting confused (e.g., disoriented, slurred speech)    Negative: [1] SEVERE abdominal pain (e.g., excruciating) AND [2] constant AND [3] present > 1 hour    Negative: Severe bleeding (e.g., continuous red blood from vagina, or large blood clots)    Negative: Umbilical cord hanging out of the vagina (shiny, white, curled appearance, \"like telephone cord\")    Negative: Uncontrollable urge to push (i.e., feels like baby is coming out now)    Negative: Can see baby    Negative: Sounds like a life-threatening emergency to the triager    Negative: [1] First baby (primipara) AND [2] contractions < 6 minutes apart  AND [3] present 2 hours    Protocols used: PREGNANCY - LABOR-A-AH      "

## 2020-02-12 NOTE — PLAN OF CARE
Data: Opal Hillman transferred to 422 via wheelchair at 0700. Baby transferred via crib.  Action: Receiving unit notified of transfer: Yes. Patient and family notified of room change. Report given to LARRY Irwin at 0720. Belongings sent to receiving unit. Accompanied by Registered Nurse. Oriented patient to surroundings. Call light within reach. ID bands double-checked with receiving RN.  Response: Patient tolerated transfer and is stable.

## 2020-02-13 LAB
GLUCOSE BLDC GLUCOMTR-MCNC: 101 MG/DL (ref 70–99)
HGB BLD-MCNC: 8.9 G/DL (ref 11.7–15.7)

## 2020-02-13 PROCEDURE — 85018 HEMOGLOBIN: CPT | Performed by: OBSTETRICS & GYNECOLOGY

## 2020-02-13 PROCEDURE — 25000132 ZZH RX MED GY IP 250 OP 250 PS 637: Performed by: FAMILY MEDICINE

## 2020-02-13 PROCEDURE — 25000128 H RX IP 250 OP 636: Performed by: OBSTETRICS & GYNECOLOGY

## 2020-02-13 PROCEDURE — 36415 COLL VENOUS BLD VENIPUNCTURE: CPT | Performed by: OBSTETRICS & GYNECOLOGY

## 2020-02-13 PROCEDURE — 00000146 ZZHCL STATISTIC GLUCOSE BY METER IP

## 2020-02-13 PROCEDURE — 25000132 ZZH RX MED GY IP 250 OP 250 PS 637: Performed by: OBSTETRICS & GYNECOLOGY

## 2020-02-13 PROCEDURE — 40000083 ZZH STATISTIC IP LACTATION SERVICES 1-15 MIN

## 2020-02-13 PROCEDURE — 12000000 ZZH R&B MED SURG/OB

## 2020-02-13 RX ORDER — OXYCODONE HYDROCHLORIDE 5 MG/1
5-10 TABLET ORAL EVERY 4 HOURS PRN
Status: DISCONTINUED | OUTPATIENT
Start: 2020-02-13 | End: 2020-02-15 | Stop reason: HOSPADM

## 2020-02-13 RX ORDER — FERROUS SULFATE 325(65) MG
325 TABLET ORAL DAILY
Status: DISCONTINUED | OUTPATIENT
Start: 2020-02-13 | End: 2020-02-15 | Stop reason: HOSPADM

## 2020-02-13 RX ADMIN — ACETAMINOPHEN 975 MG: 325 TABLET, FILM COATED ORAL at 00:07

## 2020-02-13 RX ADMIN — IBUPROFEN 800 MG: 800 TABLET ORAL at 06:12

## 2020-02-13 RX ADMIN — ACETAMINOPHEN 975 MG: 325 TABLET, FILM COATED ORAL at 21:35

## 2020-02-13 RX ADMIN — KETOROLAC TROMETHAMINE 30 MG: 30 INJECTION, SOLUTION INTRAMUSCULAR at 00:07

## 2020-02-13 RX ADMIN — ENOXAPARIN SODIUM 40 MG: 40 INJECTION SUBCUTANEOUS at 06:12

## 2020-02-13 RX ADMIN — SENNOSIDES AND DOCUSATE SODIUM 1 TABLET: 8.6; 5 TABLET ORAL at 08:22

## 2020-02-13 RX ADMIN — ACETAMINOPHEN 975 MG: 325 TABLET, FILM COATED ORAL at 08:22

## 2020-02-13 RX ADMIN — OXYCODONE HYDROCHLORIDE 5 MG: 5 TABLET ORAL at 10:32

## 2020-02-13 RX ADMIN — FERROUS SULFATE TAB 325 MG (65 MG ELEMENTAL FE) 325 MG: 325 (65 FE) TAB at 19:28

## 2020-02-13 NOTE — PLAN OF CARE
Patient complaining of numbness in toes this shift. Was able to get out of bed and march in place. Patient was also able to take a couple steps. Patient declined walking any further. Using tylenol and Toradol for pain. Blood sugars stable. Education taught to patient and patient verbalized understanding.

## 2020-02-13 NOTE — LACTATION NOTE
Lactation visit. Baby at breast at time of visit, gulping at the breast. Mom reports baby is nursing well without problem or questions. Encouraged Mom to call us PRN

## 2020-02-13 NOTE — PROGRESS NOTES
[facility]   Obstetrics Post-Op / Progress Note         Assessment and Plan:    Assessment:   Post-operative day #1  Low transverse repeat  section  GDMA2   BMI 41 lovenox while in hospital  L&D complications: Previous  section [Z98.891]      Doing well.  Tolerating physical therapy and rehabilitation well.      Plan:   Ambulation encouraged  Anticipate discharge tomorrow            Interval History:   Doing well.  Pain is well-controlled.  No fevers.  No history of wound drainage, warmth or significant erythema.  Good appetite.  Denies chest pain, shortness of breath, nausea or vomiting.  Ambulatory.  Breastfeeding well.          Significant Problems:    None          Review of Systems:    CONSTITUTIONAL: NEGATIVE for fever, chills, change in weight  INTEGUMENTARY/SKIN: NEGATIVE for worrisome rashes, moles or lesions  EYES: NEGATIVE for vision changes or irritation  ENT/MOUTH: NEGATIVE for ear, mouth and throat problems  RESP: NEGATIVE for significant cough or SOB  BREAST: NEGATIVE for masses, tenderness or discharge  CV: NEGATIVE for chest pain, palpitations or peripheral edema  GI: NEGATIVE for nausea, abdominal pain, heartburn, or change in bowel habits  : NEGATIVE for frequency, dysuria, or hematuria  MUSCULOSKELETAL: NEGATIVE for significant arthralgias or myalgia  NEURO: NEGATIVE for weakness, dizziness or paresthesias  ENDOCRINE: NEGATIVE for temperature intolerance, skin/hair changes  HEME: NEGATIVE for bleeding problems  PSYCHIATRIC: NEGATIVE for changes in mood or affect          Medications:   All medications related to the patient's surgery have been reviewed  -          Physical Exam:     All vitals stable  Patient Vitals for the past 8 hrs:   BP Temp Temp src Pulse Resp SpO2   20 0000 108/63 98.9  F (37.2  C) Oral 73 17 97 %     Bandage clean and dry with minimal or no drainage.  Surrounding skin with minimal erythema.          Data:     All laboratory data  related to this surgery reviewed  Hemoglobin   Date Value Ref Range Status   02/13/2020 8.9 (L) 11.7 - 15.7 g/dL Final   02/12/2020 11.4 (L) 11.7 - 15.7 g/dL Final   12/12/2019 11.7 11.7 - 15.7 g/dL Final   07/22/2019 13.8 11.7 - 15.7 g/dL Final   05/12/2018 9.8 (L) 11.7 - 15.7 g/dL Final     -    Lanie Conn DO

## 2020-02-13 NOTE — PLAN OF CARE
Patient is vitally stable. Incisional dressing CDI. Independent with cares. Flaherty still in place. Draining adequate amounts of orange/yellow urine. Will remove in AM. Pain is well managed with scheduled Tylenol and Toradol. Breastfeeding well with minimal assistance from staff requested. Education was provided regarding safe sleep, satiety cues, and breastfeeding benefits. Patient is attentive to infant needs and bonding well.

## 2020-02-13 NOTE — PLAN OF CARE
0715: Assumed cares on patient. Report received. Patient states that she would like to rest until 0900 and then is willing to have her page removed.   0945: Attempted to get pt up to BR to take page out. Pt having too much pain. Offered for patient to take oxycodone for pain and to try getting oob again 30 min after medication. Pt agreed to plan. No oxycodone orders for pt. Dr. Conn paged to get oxy orders.   0951: Orders received to give patient oxycodone.   1032: 5mg oxycodone given per pt request. Plan for patient to feed infant and then to get patient up to BR after feeding.   1110: Pt up to BR with assist x1. Page catheter removed. Encouraged pt to sit up in chair and to walk in halls today. Pt plans to sit up in chair now and ambulate in halls after she eats.  1150: Dr. Conn updated on patients Hgb of 8.9. Orders to give ferrous sulfate 325mg daily. Orders also to discharge glucose checks.   Cynthia Goldberg RN

## 2020-02-14 PROCEDURE — 25000132 ZZH RX MED GY IP 250 OP 250 PS 637: Performed by: OBSTETRICS & GYNECOLOGY

## 2020-02-14 PROCEDURE — 12000000 ZZH R&B MED SURG/OB

## 2020-02-14 PROCEDURE — 25000132 ZZH RX MED GY IP 250 OP 250 PS 637: Performed by: FAMILY MEDICINE

## 2020-02-14 PROCEDURE — 25000128 H RX IP 250 OP 636: Performed by: OBSTETRICS & GYNECOLOGY

## 2020-02-14 RX ADMIN — IBUPROFEN 800 MG: 800 TABLET ORAL at 10:56

## 2020-02-14 RX ADMIN — IBUPROFEN 800 MG: 800 TABLET ORAL at 00:55

## 2020-02-14 RX ADMIN — SENNOSIDES AND DOCUSATE SODIUM 2 TABLET: 8.6; 5 TABLET ORAL at 10:56

## 2020-02-14 RX ADMIN — ENOXAPARIN SODIUM 40 MG: 40 INJECTION SUBCUTANEOUS at 06:07

## 2020-02-14 RX ADMIN — SENNOSIDES AND DOCUSATE SODIUM 1 TABLET: 8.6; 5 TABLET ORAL at 19:50

## 2020-02-14 RX ADMIN — SENNOSIDES AND DOCUSATE SODIUM 2 TABLET: 8.6; 5 TABLET ORAL at 00:55

## 2020-02-14 RX ADMIN — ACETAMINOPHEN 975 MG: 325 TABLET, FILM COATED ORAL at 06:06

## 2020-02-14 RX ADMIN — IBUPROFEN 800 MG: 800 TABLET ORAL at 19:49

## 2020-02-14 RX ADMIN — ACETAMINOPHEN 975 MG: 325 TABLET, FILM COATED ORAL at 14:15

## 2020-02-14 RX ADMIN — FERROUS SULFATE TAB 325 MG (65 MG ELEMENTAL FE) 325 MG: 325 (65 FE) TAB at 10:56

## 2020-02-14 NOTE — PLAN OF CARE
Patient meeting expected goals. Able to do all self cares. Up to shower this shift. Voiding without difficulty. Education taught to patient and patient verbalized understanding.

## 2020-02-14 NOTE — PLAN OF CARE
Meeting goals for shift, up and ambulating in hallways. Caring for self and infant in room with S/O and bonding well. Rates incisional comfort 5-6, declined oxycodone, abdominal binder and heat, ice pack. C/O of engorgement, encouraged to feed infant, use ice packs, demonstrated reverse pressure softening. Pumped x 1 almost 2 full bottles. Anticipate discharge to home tomorrow.

## 2020-02-14 NOTE — PROGRESS NOTES
[facility] Postop C/S Note    S:  Patient without complaints other than typical soreness.  Minimal lochia.  Flatus passed, tolerating Regular diet well    O:  Blood pressure 137/87, pulse 75, temperature 98.1  F (36.7  C), temperature source Oral, resp. rate 17, last menstrual period 05/20/2019, SpO2 98 %, unknown if currently breastfeeding.            Intake/Output Summary (Last 24 hours) at 2/14/2020 0825  Last data filed at 2/13/2020 1200  Gross per 24 hour   Intake --   Output 500 ml   Net -500 ml           Abdomen - Non-distended, Normoactive bowel sounds, soft, appropriately tender; Fundus firm, at umbilicus, nontender        Dressing/Incision - clean, dry, intact        Extremities - No calf tenderness    Hemoglobin   Date Value Ref Range Status   02/13/2020 8.9 (L) 11.7 - 15.7 g/dL Final   02/12/2020 11.4 (L) 11.7 - 15.7 g/dL Final   ]      A:   Postop Day# 2, s/p Repeat LTCS - doing well        Postop anemia - due to typical intraop blood loss, no sx's, on po Fe        Morbid obesity - on Lovenox for DVT prophylaxis while in hosp.    P:  1)  Routine care        2)  Probable D/C tomorrow      Al Smith MD

## 2020-02-14 NOTE — PLAN OF CARE
Patient is vitally stable. Incision is well approximated and dressing clean, dry, and intact. Ambulating and independent with cares. Pain is well managed with scheduled Tylenol and PRN Ibuprofen. Breastfeeding well with no assistance requested from staff. Infant gulping at the breast. Patient is attentive to infant needs and bonding well.

## 2020-02-14 NOTE — PROGRESS NOTES
Patient unavailable when Public Health Nurse (PHN) stopped by to discuss [facility] Public Health resources.

## 2020-02-15 VITALS
TEMPERATURE: 98.7 F | OXYGEN SATURATION: 18 % | DIASTOLIC BLOOD PRESSURE: 79 MMHG | HEART RATE: 91 BPM | RESPIRATION RATE: 18 BRPM | SYSTOLIC BLOOD PRESSURE: 132 MMHG

## 2020-02-15 LAB — PLATELET # BLD AUTO: 257 10E9/L (ref 150–450)

## 2020-02-15 PROCEDURE — 25000132 ZZH RX MED GY IP 250 OP 250 PS 637: Performed by: FAMILY MEDICINE

## 2020-02-15 PROCEDURE — 25000132 ZZH RX MED GY IP 250 OP 250 PS 637: Performed by: OBSTETRICS & GYNECOLOGY

## 2020-02-15 PROCEDURE — 36415 COLL VENOUS BLD VENIPUNCTURE: CPT | Performed by: OBSTETRICS & GYNECOLOGY

## 2020-02-15 PROCEDURE — 85049 AUTOMATED PLATELET COUNT: CPT | Performed by: OBSTETRICS & GYNECOLOGY

## 2020-02-15 RX ORDER — OXYCODONE HYDROCHLORIDE 5 MG/1
5-10 TABLET ORAL EVERY 4 HOURS PRN
Qty: 10 TABLET | Refills: 0 | Status: SHIPPED | OUTPATIENT
Start: 2020-02-15

## 2020-02-15 RX ORDER — OXYCODONE HYDROCHLORIDE 5 MG/1
5 TABLET ORAL EVERY 6 HOURS PRN
Qty: 5 TABLET | Refills: 0 | Status: SHIPPED | OUTPATIENT
Start: 2020-02-15

## 2020-02-15 RX ORDER — ACETAMINOPHEN 500 MG
500-1000 TABLET ORAL EVERY 6 HOURS PRN
Qty: 30 TABLET | Refills: 1 | Status: SHIPPED | OUTPATIENT
Start: 2020-02-15

## 2020-02-15 RX ORDER — IBUPROFEN 800 MG/1
800 TABLET, FILM COATED ORAL EVERY 8 HOURS PRN
Qty: 60 TABLET | Refills: 1 | Status: SHIPPED | OUTPATIENT
Start: 2020-02-15

## 2020-02-15 RX ORDER — DOCUSATE SODIUM 100 MG/1
100 CAPSULE, LIQUID FILLED ORAL 2 TIMES DAILY
Qty: 60 CAPSULE | Refills: 1 | Status: SHIPPED | OUTPATIENT
Start: 2020-02-15

## 2020-02-15 RX ADMIN — IBUPROFEN 800 MG: 800 TABLET ORAL at 04:06

## 2020-02-15 RX ADMIN — IBUPROFEN 800 MG: 800 TABLET ORAL at 10:16

## 2020-02-15 RX ADMIN — FERROUS SULFATE TAB 325 MG (65 MG ELEMENTAL FE) 325 MG: 325 (65 FE) TAB at 10:16

## 2020-02-15 NOTE — PLAN OF CARE
Patient is vitally stable. Incision is well approximated and dressing clean, dry, and intact. Using interdry. Ambulating and independent with cares. Voiding spontaneously. Pain is well managed with Tylenol and Ibuprofen. Breastfeeding well with no assistance requested from staff. Pumped many tall bottles full of milk on shift. Infant gulping at the breast. Patient is attentive to infant needs and bonding well.

## 2020-02-15 NOTE — DISCHARGE SUMMARY
25 y/o  2 s/p [facility] POD # 3    Hemoglobin   Date Value Ref Range Status   2020 8.9 (L) 11.7 - 15.7 g/dL Final   ]    d/c home   On  colace, breast pump and ibuprofen ferrous sulfate oxycodone   Follow-up in 1-2 weeks for post op check   Follow-up in 6 weeks for post partum examination    [facility]   Obstetrics Post-Op / Progress Note         Assessment and Plan:    Assessment:   Post-operative day #3  Low transverse repeat  section  L&D complications: Previous  section [Z98.891]      Doing well.  discharge home      Plan:   Ambulation encouraged  Discharge later today            Interval History:     Doing well.  Pain is well-controlled.  No fevers.  No history of wound drainage, warmth or significant erythema.  Good appetite.  Denies chest pain, shortness of breath, nausea or vomiting.  Ambulatory.  Breastfeeding well.          Significant Problems:    None          Review of Systems:    CONSTITUTIONAL: NEGATIVE for fever, chills, change in weight  INTEGUMENTARY/SKIN: NEGATIVE for worrisome rashes, moles or lesions  EYES: NEGATIVE for vision changes or irritation  ENT/MOUTH: NEGATIVE for ear, mouth and throat problems  RESP: NEGATIVE for significant cough or SOB  BREAST: NEGATIVE for masses, tenderness or discharge  CV: NEGATIVE for chest pain, palpitations or peripheral edema  GI: NEGATIVE for nausea, abdominal pain, heartburn, or change in bowel habits  : NEGATIVE for frequency, dysuria, or hematuria  MUSCULOSKELETAL: NEGATIVE for significant arthralgias or myalgia  NEURO: NEGATIVE for weakness, dizziness or paresthesias  ENDOCRINE: NEGATIVE for temperature intolerance, skin/hair changes  HEME: NEGATIVE for bleeding problems  PSYCHIATRIC: NEGATIVE for changes in mood or affect          Medications:   All medications related to the patient's surgery have been reviewed          Physical Exam:   All vitals stable  No data found.    Wound clean and dry with minimal or no  drainage.  Surrounding skin with minimal erythema.          Data:     All laboratory data related to this surgery reviewed  Hemoglobin   Date Value Ref Range Status   02/13/2020 8.9 (L) 11.7 - 15.7 g/dL Final   02/12/2020 11.4 (L) 11.7 - 15.7 g/dL Final   12/12/2019 11.7 11.7 - 15.7 g/dL Final   07/22/2019 13.8 11.7 - 15.7 g/dL Final   05/12/2018 9.8 (L) 11.7 - 15.7 g/dL Final     -    Lanie Conn, DO      Dr. Lanie Conn, DO    OB/GYN   [facility]

## 2020-02-15 NOTE — PROGRESS NOTES
[facility]   Obstetrics Post-Op / Progress Note         Assessment and Plan:    Assessment:   Post-operative day #3  Low transverse repeat  section  L&D complications: Previous  section [Z98.891]      Doing well.  discharge home      Plan:   Ambulation encouraged  Discharge later today            Interval History:     Doing well.  Pain is well-controlled.  No fevers.  No history of wound drainage, warmth or significant erythema.  Good appetite.  Denies chest pain, shortness of breath, nausea or vomiting.  Ambulatory.  Breastfeeding well.          Significant Problems:    None          Review of Systems:    CONSTITUTIONAL: NEGATIVE for fever, chills, change in weight  INTEGUMENTARY/SKIN: NEGATIVE for worrisome rashes, moles or lesions  EYES: NEGATIVE for vision changes or irritation  ENT/MOUTH: NEGATIVE for ear, mouth and throat problems  RESP: NEGATIVE for significant cough or SOB  BREAST: NEGATIVE for masses, tenderness or discharge  CV: NEGATIVE for chest pain, palpitations or peripheral edema  GI: NEGATIVE for nausea, abdominal pain, heartburn, or change in bowel habits  : NEGATIVE for frequency, dysuria, or hematuria  MUSCULOSKELETAL: NEGATIVE for significant arthralgias or myalgia  NEURO: NEGATIVE for weakness, dizziness or paresthesias  ENDOCRINE: NEGATIVE for temperature intolerance, skin/hair changes  HEME: NEGATIVE for bleeding problems  PSYCHIATRIC: NEGATIVE for changes in mood or affect          Medications:   All medications related to the patient's surgery have been reviewed          Physical Exam:   All vitals stable  No data found.    Wound clean and dry with minimal or no drainage.  Surrounding skin with minimal erythema.          Data:     All laboratory data related to this surgery reviewed  Hemoglobin   Date Value Ref Range Status   2020 8.9 (L) 11.7 - 15.7 g/dL Final   2020 11.4 (L) 11.7 - 15.7 g/dL Final   2019 11.7 11.7 - 15.7 g/dL Final    07/22/2019 13.8 11.7 - 15.7 g/dL Final   05/12/2018 9.8 (L) 11.7 - 15.7 g/dL Final     -    DO Dr. Lanie Ray, DO    OB/GYN   [facility]

## 2020-02-15 NOTE — DISCHARGE INSTRUCTIONS
Preeclampsia   Call your doctor right away if you have any of the following:  - Edema (swelling) in your face or hands  - Rapid weight gain-about 1 pound or more in a day  - Headache  - Abdominal pain on your right side  - Vision problems (flashes or spots)  - You have questions or concerns once you return home    Follow up in 1-2 weeks   Call [phone] for concerns    Call and ask to be seen or talk to a doctor for the following (You can go to the ob triage button on answering service line [phone]):        Increased bleeding, fever, general unwell feeling or increased pain.   Please call for any reason or concern!     Dr. Lanie Conn, DO    OB/GYN   [facility]    Postop  Birth Instructions  Lactation: [phone] OB 1 week and 6 weeks     Activity       Do not lift more than 10 pounds for 6 weeks after surgery.  Ask family and friends for help when you need it.    No driving until you have stopped taking your pain medications (usually two weeks after surgery).    No heavy exercise or activity for 6 weeks.  Don't do anything that will put a strain on your surgery site.    Don't strain when using the toilet.  Your care team may prescribe a stool softener if you have problems with your bowel movements.     To care for your incision:       Keep the incision clean and dry.    Do not soak your incision in water. No swimming or hot tubs until it has fully healed. You may soak in the bathtub if the water level is below your incision.    Do not use peroxide, gel, cream, lotion, or ointment on your incision.    Adjust your clothes to avoid pressure on your surgery site (check the elastic in your underwear for example).     You may see a small amount of clear or pink drainage and this is normal.  Check with your health care provider:       If the drainage increases or has an odor.    If the incision reddens, you have swelling, or develop a rash.    If you have  increased pain and the medicine we prescribed doesn't help.    If you have a fever above 100.4 F (38 C) with or without chills when placing thermometer under your tongue.   The area around your incision (surgery wound), will feel numb.  This is normal. The numbness should go away in less than a year.     Keep your hands clean:  Always wash your hands before touching your incision (surgery wound). This helps reduce your risk of infection. If your hands aren't dirty, you may use an alcohol hand-rub to clean your hands. Keep your nails clean and short.    Call your healthcare provider if you have any of these symptoms:       You soak a sanitary pad with blood within 1 hour, or you see blood clots larger than a golf ball.    Bleeding that lasts more than 6 weeks.    Vaginal discharge that smells bad.    Severe pain, cramping or tenderness in your lower belly area.    A need to urinate more frequently (use the toilet more often), more urgently (use the toilet very quickly), or it burns when you urinate.    Nausea and vomiting.    Redness, swelling or pain around a vein in your leg.    Problems breastfeeding or a red or painful area on your breast.    Chest pain and cough or are gasping for air.    Problems with coping with sadness, anxiety or depression. If you have concerns about hurting yourself or the baby, call your provider immediately.      You have questions or concerns after you return home.

## 2020-02-15 NOTE — PLAN OF CARE
"Pt is breast feeding independently, infant was gulping at breast, has 2 large bottles (almost filled up) in fridge; vss, pt has rested in early afternoon at shift, complained about mild headache that \"does not go away with ibuprofen or tylenol\" per pt, reported relief after rest, declined pharmacological interventions for most of the shift, encouraged pt to stay on top of pain and take meds when they are available; encouraged pt to work on the birth certificate and PPD; plan of care is reviewed with pt.  "

## 2020-02-15 NOTE — PLAN OF CARE
AVS/DC teaching and medications reviewed with and given to patient. Patient is aware of when to call and follow-up. Patient is aware of resources available.  Aware of [place] scale and when to retake and call.Teaching is completed. Patient declined tdap, also declined lovenox this am as well, Dr. Conn aware. Discharged to home with baby at 1340.

## 2020-03-02 ENCOUNTER — HEALTH MAINTENANCE LETTER (OUTPATIENT)
Age: 25
End: 2020-03-02

## 2020-12-20 ENCOUNTER — HEALTH MAINTENANCE LETTER (OUTPATIENT)
Age: 25
End: 2020-12-20

## 2021-01-01 NOTE — LACTATION NOTE
Problem: Physiological Stability  Goal: Vital signs and physical assessments stable and within expected parameters  Outcome: Outcome Not Met, Continue to Monitor     Shift Summary:  Infant taking 18-25ml PO per feeding. Blood sugars 54, 77, 78, and 61. IV fluids weaned to 7.1ml this shift.    This note was copied from a baby's chart.  LC visit.  Her baby has been nursing well on both sides.  LC observed a good feeding on the right with swallows.  No issues noted, blood sugars have been stable.  LC reviewed nursing on demand, at least every 3 hours, and using HE if needed.  All questions were answered.

## 2021-04-24 ENCOUNTER — HEALTH MAINTENANCE LETTER (OUTPATIENT)
Age: 26
End: 2021-04-24

## 2021-10-03 ENCOUNTER — HEALTH MAINTENANCE LETTER (OUTPATIENT)
Age: 26
End: 2021-10-03

## 2022-05-15 ENCOUNTER — HEALTH MAINTENANCE LETTER (OUTPATIENT)
Age: 27
End: 2022-05-15

## 2022-09-10 ENCOUNTER — HEALTH MAINTENANCE LETTER (OUTPATIENT)
Age: 27
End: 2022-09-10

## 2023-06-03 ENCOUNTER — HEALTH MAINTENANCE LETTER (OUTPATIENT)
Age: 28
End: 2023-06-03

## 2024-01-29 NOTE — TELEPHONE ENCOUNTER
Type of surgery:  delivery  Location of surgery: [facility]  Date and time of surgery: 20 @ 7;30 am  Surgeon: Dr. Jorgensen  Pre-Op Appt Date: @ prenatal appointment  Post-Op Appt Date: Patient advised to schedule.   Packet sent out: Yes  Pre-cert/Authorization completed:  No  Date: 19     English

## (undated) DEVICE — BLADE CLIPPER SGL USE 9680

## (undated) DEVICE — PREP CHLORAPREP 26ML TINTED ORANGE  260815

## (undated) DEVICE — SOL WATER IRRIG 1000ML BOTTLE 2F7114

## (undated) DEVICE — SU MONOCRYL 0 CTX 36" Y398H

## (undated) DEVICE — LINEN BABY BLANKET 5434

## (undated) DEVICE — LINEN FULL SHEET 5511

## (undated) DEVICE — LINEN HALF SHEET 5512

## (undated) DEVICE — TRANSFER DEVICE BLOOD NDL HOLDER 364880

## (undated) DEVICE — DRSG STERI STRIP 1/2X4" R1547

## (undated) DEVICE — SU VICRYL 0 CT-1 36" J346H

## (undated) DEVICE — SU PLAIN 2-0 CT-1 27" 843H

## (undated) DEVICE — LINEN TOWEL PACK X10 5473

## (undated) DEVICE — STOCKING SLEEVE VASOPRESS COMPRESSION CALF MED VP501M

## (undated) DEVICE — SU CHROMIC 1 CT 27" 803H

## (undated) DEVICE — CATH TRAY FOLEY SURESTEP 16FR DRAIN BAG STATOCK A899916

## (undated) DEVICE — SUCTION CANISTER MEDIVAC LINER 3000ML W/LID 65651-530

## (undated) DEVICE — CAP BABY PINK/BLUE IC-2

## (undated) DEVICE — SU PLAIN GUT 3-0 XTX 27" 873

## (undated) DEVICE — PACK C-SECTION LF PL15OTA83B

## (undated) DEVICE — SU MONOCRYL 2-0 CT-1 36" UND Y945H

## (undated) DEVICE — SOLIDIFIER FLUID RED 1500ML LIQUID KIT SYS POWDER ISOB1500

## (undated) DEVICE — GLOVE PROTEXIS W/NEU-THERA 6.5  2D73TE65

## (undated) DEVICE — SOL NACL 0.9% IRRIG 1000ML BOTTLE 2F7124

## (undated) DEVICE — STPL SKIN 35W APPOSE 8886803712

## (undated) DEVICE — BAG CLEAR TRASH 1.3M 39X33" P4040C

## (undated) DEVICE — ESU GROUND PAD ADULT W/CORD E7507

## (undated) DEVICE — SUCTION MITY VAC MUSHROOM CUP 10007LP

## (undated) DEVICE — GLOVE PROTEXIS BLUE W/NEU-THERA 7.0  2D73EB70

## (undated) DEVICE — GLOVE PROTEXIS W/NEU-THERA 7.5  2D73TE75

## (undated) DEVICE — SU MONOCRYL 3-0 PS-2 27" Y427H

## (undated) DEVICE — GLOVE PROTEXIS W/NEU-THERA 5.5  2D73TE55

## (undated) DEVICE — STOCKING SLEEVE VASOPRESS COMPRESSION CALF MED 18" VP501M

## (undated) DEVICE — SU PDS II 0 CTX 60" Z990G

## (undated) DEVICE — SU VICRYL 1 CTX 36" J371H

## (undated) DEVICE — Device

## (undated) RX ORDER — ONDANSETRON 2 MG/ML
INJECTION INTRAMUSCULAR; INTRAVENOUS
Status: DISPENSED
Start: 2018-05-11

## (undated) RX ORDER — EPHEDRINE SULFATE 50 MG/ML
INJECTION, SOLUTION INTRAMUSCULAR; INTRAVENOUS; SUBCUTANEOUS
Status: DISPENSED
Start: 2020-02-12

## (undated) RX ORDER — MORPHINE SULFATE 1 MG/ML
INJECTION, SOLUTION EPIDURAL; INTRATHECAL; INTRAVENOUS
Status: DISPENSED
Start: 2018-05-11

## (undated) RX ORDER — EPHEDRINE SULFATE 50 MG/ML
INJECTION, SOLUTION INTRAMUSCULAR; INTRAVENOUS; SUBCUTANEOUS
Status: DISPENSED
Start: 2018-05-11

## (undated) RX ORDER — OXYTOCIN/0.9 % SODIUM CHLORIDE 30/500 ML
PLASTIC BAG, INJECTION (ML) INTRAVENOUS
Status: DISPENSED
Start: 2020-02-12

## (undated) RX ORDER — OXYTOCIN/0.9 % SODIUM CHLORIDE 30/500 ML
PLASTIC BAG, INJECTION (ML) INTRAVENOUS
Status: DISPENSED
Start: 2018-05-11

## (undated) RX ORDER — MORPHINE SULFATE 1 MG/ML
INJECTION, SOLUTION EPIDURAL; INTRATHECAL; INTRAVENOUS
Status: DISPENSED
Start: 2020-02-12

## (undated) RX ORDER — GLYCOPYRROLATE 0.2 MG/ML
INJECTION INTRAMUSCULAR; INTRAVENOUS
Status: DISPENSED
Start: 2020-02-12

## (undated) RX ORDER — ONDANSETRON 2 MG/ML
INJECTION INTRAMUSCULAR; INTRAVENOUS
Status: DISPENSED
Start: 2020-02-12

## (undated) RX ORDER — DEXAMETHASONE SODIUM PHOSPHATE 4 MG/ML
INJECTION, SOLUTION INTRA-ARTICULAR; INTRALESIONAL; INTRAMUSCULAR; INTRAVENOUS; SOFT TISSUE
Status: DISPENSED
Start: 2020-02-12

## (undated) RX ORDER — PHENYLEPHRINE HCL IN 0.9% NACL 1 MG/10 ML
SYRINGE (ML) INTRAVENOUS
Status: DISPENSED
Start: 2018-05-11

## (undated) RX ORDER — PHENYLEPHRINE HCL IN 0.9% NACL 1 MG/10 ML
SYRINGE (ML) INTRAVENOUS
Status: DISPENSED
Start: 2020-02-12